# Patient Record
Sex: MALE | Race: WHITE | NOT HISPANIC OR LATINO | Employment: OTHER | URBAN - METROPOLITAN AREA
[De-identification: names, ages, dates, MRNs, and addresses within clinical notes are randomized per-mention and may not be internally consistent; named-entity substitution may affect disease eponyms.]

---

## 2019-06-28 ENCOUNTER — OFFICE VISIT (OUTPATIENT)
Dept: FAMILY MEDICINE | Facility: CLINIC | Age: 69
End: 2019-06-28
Payer: COMMERCIAL

## 2019-06-28 VITALS
OXYGEN SATURATION: 99 % | DIASTOLIC BLOOD PRESSURE: 82 MMHG | HEART RATE: 60 BPM | BODY MASS INDEX: 28.77 KG/M2 | SYSTOLIC BLOOD PRESSURE: 124 MMHG | WEIGHT: 192 LBS | TEMPERATURE: 97.1 F

## 2019-06-28 DIAGNOSIS — K20.80 ESOPHAGITIS DUE TO DRUG: ICD-10-CM

## 2019-06-28 DIAGNOSIS — I10 HTN, GOAL BELOW 140/90: ICD-10-CM

## 2019-06-28 DIAGNOSIS — Z76.89 ENCOUNTER TO ESTABLISH CARE: Primary | ICD-10-CM

## 2019-06-28 DIAGNOSIS — T36.4X5A ESOPHAGITIS DUE TO DRUG: ICD-10-CM

## 2019-06-28 PROCEDURE — 99203 OFFICE O/P NEW LOW 30 MIN: CPT | Performed by: FAMILY MEDICINE

## 2019-06-28 RX ORDER — CHLORTHALIDONE 25 MG/1
25 TABLET ORAL DAILY
Qty: 90 TABLET | Refills: 1 | Status: SHIPPED | OUTPATIENT
Start: 2019-06-28 | End: 2019-07-05

## 2019-06-28 RX ORDER — ATENOLOL 50 MG/1
50 TABLET ORAL DAILY
Qty: 90 TABLET | Refills: 1 | Status: SHIPPED | OUTPATIENT
Start: 2019-06-28 | End: 2019-07-05

## 2019-06-28 RX ORDER — ATENOLOL AND CHLORTHALIDONE TABLET 50; 25 MG/1; MG/1
1 TABLET ORAL DAILY
COMMUNITY
End: 2019-07-05

## 2019-06-28 RX ORDER — ATENOLOL AND CHLORTHALIDONE TABLET 50; 25 MG/1; MG/1
1 TABLET ORAL DAILY
Qty: 90 TABLET | Status: CANCELLED | OUTPATIENT
Start: 2019-06-28

## 2019-06-28 NOTE — PROGRESS NOTES
Subjective     Cezar Ward is a 68 year old male who presents to clinic today for the following health issues:    HPI   New Patient/Transfer of Care  Patient was with [facility] insurance changed and following today to establish care.  Discharging up physical last year in September.  He does take atenolol and chlorthalidone for blood pressure.  Been doing a combination of medication but wants to have a separate as is cheaper.    HTN:   Atenolol-chlorthalidone.  Blood pressure is well controlled and tolerating medications well.    Dysphagia:  Had tooth implant was given Clindamycin, last dose less than 1 week ago  Since then after eating feels like has stuff stuck in throat  Feels fine    Wellness:   Shingles: had 2 doses already  Colonoscopy: Last was 2/2019 Every 2 yrs due to polyps    There is no problem list on file for this patient.    Past Surgical History:   Procedure Laterality Date     TONSILLECTOMY  1959       Social History     Tobacco Use     Smoking status: Never Smoker     Smokeless tobacco: Never Used   Substance Use Topics     Alcohol use: Yes     Family History   Problem Relation Age of Onset     Hypertension Mother      Hypertension Father      Unknown/Adopted Maternal Grandmother      Unknown/Adopted Maternal Grandfather      Unknown/Adopted Paternal Grandmother      Unknown/Adopted Paternal Grandfather          Family History   Problem Relation Age of Onset     Hypertension Mother      Hypertension Father      Unknown/Adopted Maternal Grandmother      Unknown/Adopted Maternal Grandfather      Unknown/Adopted Paternal Grandmother      Unknown/Adopted Paternal Grandfather      PROBLEMS TO ADD ON...  Reviewed and updated as needed this visit by Provider    Review of Systems   Constitutional, HEENT, cardiovascular, pulmonary and gu systems are negative, except as otherwise noted.      Objective    /82   Pulse 60   Temp 97.1  F (36.2  C) (Oral)   Wt 87.1 kg (192 lb)   SpO2 99%    BMI 28.77 kg/m    Body mass index is 28.77 kg/m .  Physical Exam   GENERAL: healthy, alert and no distress  HENT: ear canals and TM's normal, nose and mouth without ulcers or lesions  NECK: no adenopathy and thyroid normal to palpation  RESP: lungs clear to auscultation - no rales, rhonchi or wheezes  CV: regular rate and rhythm, normal S1 S2, no S3 or S4, no murmur, click or rub, no peripheral edema  \ABDOMEN: soft, nontender, no masses and bowel sounds normal  MS: no gross musculoskeletal defects noted, no edema.    Assessment & Plan     Cezar was seen today for establish care and medication request.    Diagnoses and all orders for this visit:    Encounter to establish care     Reviewed medical problems and medications    HTN, goal below 140/90  -     atenolol (TENORMIN) 50 MG tablet; Take 1 tablet (50 mg) by mouth daily  -     chlorthalidone (HYGROTON) 25 MG tablet; Take 1 tablet (25 mg) by mouth daily    Esophagitis due to drug      -   Likely from medication, discussed watching the symptom for 2 weeks and if persistent will explore with a scope.    Otherwise follow-up with annual physical in 3 months    Return in about 2 weeks (around 7/12/2019) for Routine Visit.    Cory Esquivel MD  [facility]

## 2019-09-20 ENCOUNTER — OFFICE VISIT (OUTPATIENT)
Dept: ORTHOPEDICS | Facility: CLINIC | Age: 69
End: 2019-09-20
Payer: COMMERCIAL

## 2019-09-20 ENCOUNTER — ANCILLARY PROCEDURE (OUTPATIENT)
Dept: GENERAL RADIOLOGY | Facility: CLINIC | Age: 69
End: 2019-09-20
Attending: PEDIATRICS
Payer: COMMERCIAL

## 2019-09-20 VITALS
HEIGHT: 70 IN | SYSTOLIC BLOOD PRESSURE: 124 MMHG | DIASTOLIC BLOOD PRESSURE: 76 MMHG | WEIGHT: 185 LBS | BODY MASS INDEX: 26.48 KG/M2

## 2019-09-20 DIAGNOSIS — M25.561 CHRONIC PAIN OF BOTH KNEES: Primary | ICD-10-CM

## 2019-09-20 DIAGNOSIS — M25.562 BILATERAL KNEE PAIN: ICD-10-CM

## 2019-09-20 DIAGNOSIS — M25.562 CHRONIC PAIN OF BOTH KNEES: Primary | ICD-10-CM

## 2019-09-20 DIAGNOSIS — M25.561 BILATERAL KNEE PAIN: ICD-10-CM

## 2019-09-20 DIAGNOSIS — G89.29 CHRONIC PAIN OF BOTH KNEES: Primary | ICD-10-CM

## 2019-09-20 DIAGNOSIS — M17.0 PRIMARY OSTEOARTHRITIS OF BOTH KNEES: ICD-10-CM

## 2019-09-20 PROCEDURE — 73562 X-RAY EXAM OF KNEE 3: CPT | Mod: LT | Performed by: PEDIATRICS

## 2019-09-20 PROCEDURE — 99204 OFFICE O/P NEW MOD 45 MIN: CPT | Performed by: PEDIATRICS

## 2019-09-20 ASSESSMENT — MIFFLIN-ST. JEOR: SCORE: 1610.4

## 2019-09-20 NOTE — PATIENT INSTRUCTIONS
Pain in the anterior knee with biking is consistent with patellofemoral source (under the kneecap); there is some degenerative change noted in this area on x-rays  Ice, Over the counter medications as needed   Activity modification discussed  May wear compression or wrap as desired  Referred to physical therapy  Monitor course next ~1 month with PT

## 2019-09-20 NOTE — PROGRESS NOTES
Sports Medicine Clinic Visit    PCP: Juan Carlisle    Cezar Ward is a 69 year old male who is seen  as a self referral presenting with bilateral knee pain.  Pain began in his right knee about 1 year ago.  He stopped biking due to the pain with the downward motion.  He now has some pain in his left knee as well.  Pain over the proximal aspect of his knee.      **  Pain located inferior and superior of the bilateral patella. Occasional radiating pain present. Symptoms worsen with downward pressure on the right knee; pain is present when biking uphill/upgrade. He is a moderate biker.  Patient is most comfortable when knees are fully extended while biking. Patient is able to walk on treadmill with incline and stairs with not much pain.   Notes left knee has been swollen over past couple of weeks.    **  Patient noted that he has more perspiration with the same amount of exercise.    Injury: gradual onset     Location of Pain: bilateral anterior knee   Duration of Pain: 1 year(s)  Rating of Pain at worst: 6/10  Rating of Pain Currently: 1/10  Symptoms are better with: Rest  Symptoms are worse with: riding bike, prolonged walking   Additional Features:   Positive: swelling, popping    Negative: bruising, grinding, catching, locking, instability, paresthesias, numbness, weakness, pain in other joints and systemic symptoms  Other evaluation and/or treatments so far consists of: acetaminophen   Prior History of related problems: denies     Social History: artist,      Review of Systems  Musculoskeletal: as above  Remainder of review of systems is negative including constitutional, CV, pulmonary, GI, Skin and Neurologic except as noted in HPI or medical history.    Past Medical History:   Diagnosis Date     Chronic neck pain     Cervical radiculopathy     Eczema      ED (erectile dysfunction)      HTN     Borderline     Narcolepsy      Other and unspecified hyperlipidemia      Past Surgical History:    Procedure Laterality Date     TONSILLECTOMY  1959     Family History   Problem Relation Age of Onset     Hypertension Mother      Hypertension Father      Unknown/Adopted Maternal Grandmother      Unknown/Adopted Maternal Grandfather      Unknown/Adopted Paternal Grandmother      Unknown/Adopted Paternal Grandfather      Social History     Socioeconomic History     Marital status:      Spouse name: Not on file     Number of children: Not on file     Years of education: Not on file     Highest education level: Not on file   Occupational History     Not on file   Social Needs     Financial resource strain: Not on file     Food insecurity:     Worry: Not on file     Inability: Not on file     Transportation needs:     Medical: Not on file     Non-medical: Not on file   Tobacco Use     Smoking status: Never Smoker     Smokeless tobacco: Never Used   Substance and Sexual Activity     Alcohol use: Yes     Drug use: No     Sexual activity: Yes     Partners: Female   Lifestyle     Physical activity:     Days per week: Not on file     Minutes per session: Not on file     Stress: Not on file   Relationships     Social connections:     Talks on phone: Not on file     Gets together: Not on file     Attends [employer] service: Not on file     Active member of club or organization: Not on file     Attends meetings of clubs or organizations: Not on file     Relationship status: Not on file     Intimate partner violence:     Fear of current or ex partner: Not on file     Emotionally abused: Not on file     Physically abused: Not on file     Forced sexual activity: Not on file   Other Topics Concern     Parent/sibling w/ CABG, MI or angioplasty before 65F 55M? Not Asked   Social History Narrative     Not on file   This document serves as a record of the services and decisions personally performed and made by DO BURKE Lorenzo. It was created on his behalf by Gregg Mann, a trained medical scribe. The creation of this  "document is based the provider's statements to the medical scribe.    Bernardo Mann 9:03 AM 9/20/2019      Objective  /76   Ht 1.778 m (5' 10\")   Wt 83.9 kg (185 lb)   BMI 26.54 kg/m      GENERAL APPEARANCE: healthy, alert and no distress   GAIT: NORMAL  SKIN: no suspicious lesions or rashes  NEURO: Normal strength and tone, mentation intact and speech normal  PSYCH:  mentation appears normal and affect normal/bright  HEENT: no scleral icterus  CV: distal perfusion intact  RESP: nonlabored breathing    Bilateral Knee exam    ROM:        Full active and passive ROM with flexion and extension BILATERAL  Pressure noted with extension per pt    Inspection:       No visible ecchymosis       Trace fluid noted on the left knee currently    Skin:       no visible deformities       well perfused       capillary refill brisk    Patellar Motion:        Crepitus noted in the patellofemoral joint BILATERAL       No pain with patellar translation BILATERAL    Tender:        No focal tenderness    Special Tests:        neg (-) Lance       neg (-) Lachman       neg (-) anterior drawer       neg (-) posterior drawer       neg (-) varus at 0 to 30 degrees        neg (-) valgus at 0 to 30 degrees        No pain with forced extension    Evaluation of ipsilateral kinetic chain:       No pain with mini squat, single left leg squat        Mild anterior patella pain with single right leg squat           Radiology  Visualized radiographs of bilateral standing knee obtained today, and reviewed the images with the patient.  Impression: Bilateral PA, bilateral lateral, bilateral sunrise views of the knees demonstrate mild left lateral compartment narrowing relative to the right.  There also appears to be bilateral patellofemoral compartment narrowing.  Calcific densities at the posterior aspect of each knee are likely related to degenerative change, and may represent articular bodies.  Calcification at the medial aspect of " the left knee may be related to degenerative change or history of injury.  No clear acute osseous abnormality identified.    Assessment:  1. Chronic pain of both knees    2. Primary osteoarthritis of both knees        Plan:  Discussed the assessment with the patient. Degenerative change primarily patellofemoral.    Radiologic images reviewed and discussed with patient today  We discussed the following: symptom treatment, activity modification/rest, imaging, rehab, injection therapy, medication, potential for improvement with time, support for the affected area and orthopedic surgery. Following discussion, plan:  Ice, Over the counter medications as needed   Activity modification discussed   Rehab: Physical therapy referral placed   Support: Discussed compression and wrap; may wear as desired  Medications: Discussed short term NSAIDs like Ibuprofen and Naproxen, vs long term Celebrex; Patient will begin physical therapy first  Injection: Discussed cortisone injection vs visco injection vs biologics (Stem cell, PRP). Patient will begin physical therapy first   Patient inquired about a stem cell injection.   Discussed orthopedic surgery referral as the ultimate option  Follow up: 3-4 weeks if not improving with PT, sooner if needed.   Future Considerations: Medications, Injections.   Questions answered. The patient indicates understanding of these issues and agrees with the plan.    **  Discussed checking with primary care doctor if SOB and chest pain present      Kwadwo Ramirez, DO, CAQ          Patient Instructions   Pain in the anterior knee with biking is consistent with patellofemoral source (under the kneecap); there is some degenerative change noted in this area on x-rays  Ice, Over the counter medications as needed   Activity modification discussed  May wear compression or wrap as desired  Referred to physical therapy  Monitor course next ~1 month with PT          Disclaimer: This note consists of symbols  derived from keyboarding, dictation and/or voice recognition software. As a result, there may be errors in the script that have gone undetected. Please consider this when interpreting information found in this chart.    The information in this document, created by a scribe for me, accurately reflects the services I personally performed and the decisions made by me. I have reviewed and approved this document for accuracy.

## 2019-10-01 ENCOUNTER — THERAPY VISIT (OUTPATIENT)
Dept: PHYSICAL THERAPY | Facility: CLINIC | Age: 69
End: 2019-10-01
Payer: COMMERCIAL

## 2019-10-01 DIAGNOSIS — M25.562 PAIN IN BOTH KNEES: ICD-10-CM

## 2019-10-01 DIAGNOSIS — M25.561 PAIN IN BOTH KNEES: ICD-10-CM

## 2019-10-01 DIAGNOSIS — M25.562 CHRONIC PAIN OF BOTH KNEES: ICD-10-CM

## 2019-10-01 DIAGNOSIS — M25.561 CHRONIC PAIN OF BOTH KNEES: ICD-10-CM

## 2019-10-01 DIAGNOSIS — G89.29 CHRONIC PAIN OF BOTH KNEES: ICD-10-CM

## 2019-10-01 PROCEDURE — 97112 NEUROMUSCULAR REEDUCATION: CPT | Mod: GP | Performed by: PHYSICAL THERAPIST

## 2019-10-01 PROCEDURE — 97110 THERAPEUTIC EXERCISES: CPT | Mod: GP | Performed by: PHYSICAL THERAPIST

## 2019-10-01 PROCEDURE — 97161 PT EVAL LOW COMPLEX 20 MIN: CPT | Mod: GP | Performed by: PHYSICAL THERAPIST

## 2019-10-01 ASSESSMENT — ACTIVITIES OF DAILY LIVING (ADL)
HOW_WOULD_YOU_RATE_THE_OVERALL_FUNCTION_OF_YOUR_KNEE_DURING_YOUR_USUAL_DAILY_ACTIVITIES?: NEARLY NORMAL
WALK: ACTIVITY IS NOT DIFFICULT
KNEE_ACTIVITY_OF_DAILY_LIVING_SUM: 58
SIT WITH YOUR KNEE BENT: ACTIVITY IS NOT DIFFICULT
KNEEL ON THE FRONT OF YOUR KNEE: ACTIVITY IS MINIMALLY DIFFICULT
SQUAT: ACTIVITY IS MINIMALLY DIFFICULT
RAW_SCORE: 58
GO DOWN STAIRS: ACTIVITY IS NOT DIFFICULT
STIFFNESS: I DO NOT HAVE THE SYMPTOM
WEAKNESS: THE SYMPTOM AFFECTS MY ACTIVITY SEVERELY
HOW_WOULD_YOU_RATE_THE_CURRENT_FUNCTION_OF_YOUR_KNEE_DURING_YOUR_USUAL_DAILY_ACTIVITIES_ON_A_SCALE_FROM_0_TO_100_WITH_100_BEING_YOUR_LEVEL_OF_KNEE_FUNCTION_PRIOR_TO_YOUR_INJURY_AND_0_BEING_THE_INABILITY_TO_PERFORM_ANY_OF_YOUR_USUAL_DAILY_ACTIVITIES?: 80
STAND: ACTIVITY IS NOT DIFFICULT
PAIN: THE SYMPTOM AFFECTS MY ACTIVITY SEVERELY
RISE FROM A CHAIR: ACTIVITY IS MINIMALLY DIFFICULT
KNEE_ACTIVITY_OF_DAILY_LIVING_SCORE: 82.86
GIVING WAY, BUCKLING OR SHIFTING OF KNEE: I DO NOT HAVE THE SYMPTOM
AS_A_RESULT_OF_YOUR_KNEE_INJURY,_HOW_WOULD_YOU_RATE_YOUR_CURRENT_LEVEL_OF_DAILY_ACTIVITY?: ABNORMAL
LIMPING: I DO NOT HAVE THE SYMPTOM
SWELLING: I DO NOT HAVE THE SYMPTOM
GO UP STAIRS: ACTIVITY IS MINIMALLY DIFFICULT

## 2019-10-01 NOTE — PROGRESS NOTES
[facility] Initial Evaluation  Subjective:    Type of problem:  Bilateral knees (R>L)   Condition occurred with:  Insidious onset.    Problem details: Pain began about 18 months ago. First noticed it when he was biking on inclines.   Patient reports pain:  Anterior.  Associated symptoms:  Loss of motion/stiffness and loss of strength. Symptoms are exacerbated by ascending stairs, bending/squatting, sitting and transfers (biking) and relieved by rest and NSAID's.    Where condition occurred: for unknown reasons.  and reported as 7/10 on pain scale. General health as reported by patient is good. Pertinent medical history includes:  High blood pressure.    Surgeries include:  None.  Current medications:  High blood pressure medication.     Pain is described as aching and is intermittent. Pain timing: no pattern. Since onset symptoms are gradually worsening. Special tests:  X-ray.     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    Barriers include:  None as reported by patient.  Red flags:  None as reported by patient.                      Objective:  Standing Alignment:              Knee:  Normal          Flexibility/Screens:       Lower Extremity:  Decreased left lower extremity flexibility:Hamstrings    Decreased right lower extremity flexibility:  Hamstrings                                                 Hip Evaluation  HIP AROM:  AROM:    Left Hip:     Normal    Right Hip:   Normal                    Hip Strength:    Flexion:   Left: 5/5   Pain:  Right: 5/5   Pain:                    Extension:  Left: 4-/5  Pain:Right: 4/5    Pain:    Abduction:  Left: 4-/5     Pain:Right: 4/5    Pain:  Adduction:  Left: 5/5    Pain:Right: 5/5   Pain:  Internal Rotation:  Left: 5/5    Pain:Right: 5/5   Pain:              Hip Special Testing:      Left hip negative for the following special tests:  Mary Anne  Right hip negative for the following special tests:   Mary Anne           Knee Evaluation:  ROM:  AROM: normal            Strength:         Quad Set Left: Delayed    Pain:   Quad Set Right: Good    Pain:    Special Tests:   Left knee positive for the following special tests:  Patellar Compression; Patellar Tracking-Abduction Lateral and Twan's  Right knee positive for the following tests:  Patellar Compression; Patellar Tracking-Abduction Lateral and Twan's  Palpation:  Normal      Edema:  Normal    Mobility Testing:      Patellofemoral Medial:  Left: hypomobile    Right: hypomobile  Patellofemoral Lateral:  Left: hypomobile    Right: hypomobile      Functional Testing:          Quad:    Single Leg Squat:  Left:      Right:        Bilateral Leg Squat:   Mild loss of control and excessive anterior knee excursion              General     ROS    Assessment/Plan:    Patient is a 69 year old male with both sides knee complaints.    Patient has the following significant findings with corresponding treatment plan.                Diagnosis 1:  B knee pain    Pain -  hot/cold therapy, manual therapy, splint/taping/bracing/orthotics, self management, education and home program  Decreased ROM/flexibility - manual therapy, therapeutic exercise and home program  Decreased strength - therapeutic exercise, therapeutic activities and home program    Therapy Evaluation Codes:   1) History comprised of:   Personal factors that impact the plan of care:      None.    Comorbidity factors that impact the plan of care are:      None.     Medications impacting care: None.  2) Examination of Body Systems comprised of:   Body structures and functions that impact the plan of care:      Hip, Knee and Lumbar spine.   Activity limitations that impact the plan of care are:      Driving, Sports, Squatting/kneeling and Stairs.  3) Clinical presentation characteristics are:   Stable/Uncomplicated.  4) Decision-Making    Low complexity using standardized patient assessment instrument and/or measureable  assessment of functional outcome.  Cumulative Therapy Evaluation is: Low complexity.    Previous and current functional limitations:  (See Goal Flow Sheet for this information)    Short term and Long term goals: (See Goal Flow Sheet for this information)     Communication ability:  Patient appears to be able to clearly communicate and understand verbal and written communication and follow directions correctly.  Treatment Explanation - The following has been discussed with the patient:   RX ordered/plan of care  Anticipated outcomes  Possible risks and side effects  This patient would benefit from PT intervention to resume normal activities.   Rehab potential is good.    Frequency:  1 X week, once daily  Duration:  for 8 weeks  Discharge Plan:  Achieve all LTG.  Independent in home treatment program.  Reach maximal therapeutic benefit.    Please refer to the daily flowsheet for treatment today, total treatment time and time spent performing 1:1 timed codes.

## 2019-10-31 ENCOUNTER — OFFICE VISIT (OUTPATIENT)
Dept: ORTHOPEDICS | Facility: CLINIC | Age: 69
End: 2019-10-31
Payer: COMMERCIAL

## 2019-10-31 VITALS — DIASTOLIC BLOOD PRESSURE: 76 MMHG | HEIGHT: 70 IN | BODY MASS INDEX: 26.54 KG/M2 | SYSTOLIC BLOOD PRESSURE: 124 MMHG

## 2019-10-31 DIAGNOSIS — M17.12 ARTHRITIS OF LEFT KNEE: Primary | ICD-10-CM

## 2019-10-31 DIAGNOSIS — M25.561 CHRONIC PAIN OF BOTH KNEES: ICD-10-CM

## 2019-10-31 DIAGNOSIS — M25.562 CHRONIC PAIN OF BOTH KNEES: ICD-10-CM

## 2019-10-31 DIAGNOSIS — G89.29 CHRONIC PAIN OF BOTH KNEES: ICD-10-CM

## 2019-10-31 PROCEDURE — 99213 OFFICE O/P EST LOW 20 MIN: CPT | Performed by: FAMILY MEDICINE

## 2019-10-31 NOTE — PATIENT INSTRUCTIONS
Diagnosis: Left knee pain related to arthritis   Image Findings: Mild arthritis notable under the knee cap  Treatment: Relative rest, physical therapy, home exercises, can consider steroid injection  Medications: Voltaren topically  Follow-up: As needed for a steroid injection.    It was great seeing you today!    Cruz Solis

## 2019-10-31 NOTE — LETTER
10/31/2019         RE: Cezar Ward  [address] [place] [id number]        Dear Colleague,    Thank you for referring your patient, Cezar Ward, to the [facility]. Please see a copy of my visit note below.    ASSESSMENT & PLAN  Cezar was seen today for pain.    Diagnoses and all orders for this visit:    Arthritis of left knee  -     diclofenac (VOLTAREN) 1 % topical gel; Place 2 g onto the skin 4 times daily    Chronic pain of both knees      Patient is a 69 year old male presenting for evaluation of   Chief Complaint   Patient presents with     Left Knee - Pain      # Left Knee Arthritis: Notable over the past couple of weeks worse with going down stairs.  Pain behind the knee cap.  Pain with grind test otherwise neg exam today.  Previous XR showing mild OA worse in the PF compartment.  Counseled patient on nature of condition and treatment options.  Pain not severe now, no risk factors for DVT, no sig calf swelling or pain.  Low concern for DVT but will reassess should sx worsen.  Given this plan as below, follow-up as needed  Treatment: Activities as tolerated  Physical Therapy [facility], if not improved can refer for formal PT  Injection Defer for now can f/u for steroid injection should pain worsen  Medications Voltaren gel, otherwise  Limited NSAIDs/Tylenol    Concerning signs/sx that would warrant urgent evaluation were discussed.  All questions were answered, patient understands and agrees with plan.      Return if symptoms worsen or fail to improve.    -----    SUBJECTIVE  Cezar Ward is a/an 69 year old male who is seen as a self referral, [facility] for evaluation of left knee pain. The patient is seen by themselves.    Onset: 2 week(s) ago. Reports insidious onset without acute precipitating event.  Has seen Dr. Ramirez  On 9/20/19 was given recs including PT went to 2 visits with some flare of left knee.  No new injury.  Pt notes bilateral pain  "initially right now left reports some dec ROM  Location of Pain: left anterior knee   Rating of Pain at worst: \"moderate\" \"annoying\"  Rating of Pain Currently: mild  Worsened by: biking, going up and down stairs, knee   Better with: ROM   Treatments tried: physical therapy (1 visit), ice    Associated symptoms: swelling, hypermobility of patella, limited ROM   Orthopedic history: YES - Saw Dr. Ramirez 9/20/19  Relevant surgical history: NO  Past Medical History:   Diagnosis Date     Chronic neck pain     Cervical radiculopathy     Eczema      ED (erectile dysfunction)      HTN     Borderline     Narcolepsy      Other and unspecified hyperlipidemia      Social History     Socioeconomic History     Marital status:      Spouse name: None     Number of children: None     Years of education: None     Highest education level: None   Occupational History     None   Social Needs     Financial resource strain: None     Food insecurity:     Worry: None     Inability: None     Transportation needs:     Medical: None     Non-medical: None   Tobacco Use     Smoking status: Never Smoker     Smokeless tobacco: Never Used   Substance and Sexual Activity     Alcohol use: Yes     Drug use: No     Sexual activity: Yes     Partners: Female   Lifestyle     Physical activity:     Days per week: None     Minutes per session: None     Stress: None   Relationships     Social connections:     Talks on phone: None     Gets together: None     Attends [employer] service: None     Active member of club or organization: None     Attends meetings of clubs or organizations: None     Relationship status: None     Intimate partner violence:     Fear of current or ex partner: None     Emotionally abused: None     Physically abused: None     Forced sexual activity: None   Other Topics Concern     Parent/sibling w/ CABG, MI or angioplasty before 65F 55M? Not Asked   Social History Narrative     None         Patient's past medical, surgical, " "social, and family histories were reviewed today and no changes are noted.  No family history pertinent to the patient's problem today    REVIEW OF SYSTEMS:  10 point ROS is negative other than symptoms noted above in HPI, Past Medical History or as stated below  Constitutional: NEGATIVE for fever, chills, change in weight  Skin: NEGATIVE for worrisome rashes, moles or lesions  GI/: NEGATIVE for bowel or bladder changes  Neuro: NEGATIVE for weakness, dizziness or paresthesias    OBJECTIVE:  /76   Ht 1.778 m (5' 10\")   BMI 26.54 kg/m      General: healthy, alert and in no distress  HEENT: no scleral icterus or conjunctival erythema  Skin: no suspicious lesions or rash. No jaundice.  CV: no pedal edema  Resp: normal respiratory effort without conversational dyspnea   Psych: normal mood and affect  Gait: normal steady gait with appropriate coordination and balance  Neuro: Normal light sensory exam of lower extremity  MSK:  LEFT KNEE  Inspection:    normal alignment  Palpation:  Nontender.    Small effusion is present    Patellofemoral crepitus is Present  Range of Motion:     00 extension to 1200 flexion  Strength:    Quadriceps 5/5, hamstrings 5/5, gastrocsoleus 5/5 and tibialis anterior 5/5    Extensor mechanism intact  Special Tests:    Positive: Patellar grind    Negative: patellar apprehension, MCL/valgus stress (0 & 30 deg), LCL/varus stress (0 & 30 deg), Lachman's, anterior drawer, posterior drawer, Lance's    Independent visualization of the below image:  No results found for this or any previous visit (from the past 24 hour(s)).  Bilateral PA, bilateral lateral, bilateral sunrise views of the knees   demonstrate mild left lateral compartment narrowing relative to the right.    There also appears to be bilateral patellofemoral compartment narrowing.    Calcific densities at the posterior aspect of each knee are likely related   to degenerative change, and may represent articular bodies. "  Calcification   at the medial aspect of the left knee may be related to degenerative   change or history of injury.  No clear acute osseous abnormality   identified.    Kwadwo Ramirez DO, CAQ     I  visualized and reviewed these images with the patient         Cruz Solis MD, [facility]      Again, thank you for allowing me to participate in the care of your patient.        Sincerely,        Cruz Solis MD

## 2019-10-31 NOTE — PROGRESS NOTES
"ASSESSMENT & PLAN  Cezar was seen today for pain.    Diagnoses and all orders for this visit:    Arthritis of left knee  -     diclofenac (VOLTAREN) 1 % topical gel; Place 2 g onto the skin 4 times daily    Chronic pain of both knees      Patient is a 69 year old male presenting for evaluation of   Chief Complaint   Patient presents with     Left Knee - Pain      # Left Knee Arthritis: Notable over the past couple of weeks worse with going down stairs.  Pain behind the knee cap.  Pain with grind test otherwise neg exam today.  Previous XR showing mild OA worse in the PF compartment.  Counseled patient on nature of condition and treatment options.  Pain not severe now, no risk factors for DVT, no sig calf swelling or pain.  Low concern for DVT but will reassess should sx worsen.  Given this plan as below, follow-up as needed  Treatment: Activities as tolerated  Physical Therapy HEP, if not improved can refer for formal PT  Injection Defer for now can f/u for steroid injection should pain worsen  Medications Voltaren gel, otherwise  Limited NSAIDs/Tylenol    Concerning signs/sx that would warrant urgent evaluation were discussed.  All questions were answered, patient understands and agrees with plan.      Return if symptoms worsen or fail to improve.    -----    SUBJECTIVE  Cezar Ward is a/an 69 year old male who is seen as a self referral, [facility] for evaluation of left knee pain. The patient is seen by themselves.    Onset: 2 week(s) ago. Reports insidious onset without acute precipitating event.  Has seen Dr. Ramirez  On 9/20/19 was given recs including PT went to 2 visits with some flare of left knee.  No new injury.  Pt notes bilateral pain initially right now left reports some dec ROM  Location of Pain: left anterior knee   Rating of Pain at worst: \"moderate\" \"annoying\"  Rating of Pain Currently: mild  Worsened by: biking, going up and down stairs, knee   Better with: ROM   Treatments tried: physical " therapy (1 visit), ice    Associated symptoms: swelling, hypermobility of patella, limited ROM   Orthopedic history: YES - Saw Dr. Ramirez 9/20/19  Relevant surgical history: NO  Past Medical History:   Diagnosis Date     Chronic neck pain     Cervical radiculopathy     Eczema      ED (erectile dysfunction)      HTN     Borderline     Narcolepsy      Other and unspecified hyperlipidemia      Social History     Socioeconomic History     Marital status:      Spouse name: None     Number of children: None     Years of education: None     Highest education level: None   Occupational History     None   Social Needs     Financial resource strain: None     Food insecurity:     Worry: None     Inability: None     Transportation needs:     Medical: None     Non-medical: None   Tobacco Use     Smoking status: Never Smoker     Smokeless tobacco: Never Used   Substance and Sexual Activity     Alcohol use: Yes     Drug use: No     Sexual activity: Yes     Partners: Female   Lifestyle     Physical activity:     Days per week: None     Minutes per session: None     Stress: None   Relationships     Social connections:     Talks on phone: None     Gets together: None     Attends Rastafarian service: None     Active member of club or organization: None     Attends meetings of clubs or organizations: None     Relationship status: None     Intimate partner violence:     Fear of current or ex partner: None     Emotionally abused: None     Physically abused: None     Forced sexual activity: None   Other Topics Concern     Parent/sibling w/ CABG, MI or angioplasty before 65F 55M? Not Asked   Social History Narrative     None         Patient's past medical, surgical, social, and family histories were reviewed today and no changes are noted.  No family history pertinent to the patient's problem today    REVIEW OF SYSTEMS:  10 point ROS is negative other than symptoms noted above in HPI, Past Medical History or as stated  "below  Constitutional: NEGATIVE for fever, chills, change in weight  Skin: NEGATIVE for worrisome rashes, moles or lesions  GI/: NEGATIVE for bowel or bladder changes  Neuro: NEGATIVE for weakness, dizziness or paresthesias    OBJECTIVE:  /76   Ht 1.778 m (5' 10\")   BMI 26.54 kg/m     General: healthy, alert and in no distress  HEENT: no scleral icterus or conjunctival erythema  Skin: no suspicious lesions or rash. No jaundice.  CV: no pedal edema  Resp: normal respiratory effort without conversational dyspnea   Psych: normal mood and affect  Gait: normal steady gait with appropriate coordination and balance  Neuro: Normal light sensory exam of lower extremity  MSK:  LEFT KNEE  Inspection:    normal alignment  Palpation:  Nontender.    Small effusion is present    Patellofemoral crepitus is Present  Range of Motion:     00 extension to 1200 flexion  Strength:    Quadriceps 5/5, hamstrings 5/5, gastrocsoleus 5/5 and tibialis anterior 5/5    Extensor mechanism intact  Special Tests:    Positive: Patellar grind    Negative: patellar apprehension, MCL/valgus stress (0 & 30 deg), LCL/varus stress (0 & 30 deg), Lachman's, anterior drawer, posterior drawer, Lance's    Independent visualization of the below image:  No results found for this or any previous visit (from the past 24 hour(s)).  Bilateral PA, bilateral lateral, bilateral sunrise views of the knees   demonstrate mild left lateral compartment narrowing relative to the right.    There also appears to be bilateral patellofemoral compartment narrowing.    Calcific densities at the posterior aspect of each knee are likely related   to degenerative change, and may represent articular bodies.  Calcification   at the medial aspect of the left knee may be related to degenerative   change or history of injury.  No clear acute osseous abnormality   identified.    Kwadwo Ramirez DO, CAQ     I  visualized and reviewed these images with the patient     "     Cruz Solis MD, [facility]

## 2019-11-19 PROBLEM — M25.562 PAIN IN BOTH KNEES: Status: RESOLVED | Noted: 2019-10-01 | Resolved: 2019-11-19

## 2019-11-19 PROBLEM — M25.561 PAIN IN BOTH KNEES: Status: RESOLVED | Noted: 2019-10-01 | Resolved: 2019-11-19

## 2020-01-10 DIAGNOSIS — I10 HTN, GOAL BELOW 140/90: ICD-10-CM

## 2020-01-10 NOTE — LETTER
January 13, 2020          Cezar Ward,  [address] [address]          Dear Cezar Ward      Your provider has sent a 30 day thom refill of atenolol-chlorthalidone (TENORETIC) 50-25 MG tablet. You are due for an appointment for further refills. Appointment options could include: an in person office visit, telephone visit or Evisit through [facility] [employer]. Please contact the clinic to schedule an appointment for further refills.       If you have received your health care elsewhere, please call the clinic so the information can be documented in your chart.    Please call [phone] or message us through your [employer] account to schedule an appointment or provide information for your chart.     Feel free to contact us if you have any questions or concerns!    I look forward to seeing you and working with you on your health care needs.     Sincerely,       Your [facility]

## 2020-01-13 RX ORDER — ATENOLOL AND CHLORTHALIDONE TABLET 50; 25 MG/1; MG/1
TABLET ORAL
Qty: 30 TABLET | Refills: 0 | Status: SHIPPED | OUTPATIENT
Start: 2020-01-13 | End: 2020-02-11

## 2020-01-13 NOTE — TELEPHONE ENCOUNTER
Rx sent for 30 days. Please write letter to schedule for further refills.     Rose Marie Farrell RN

## 2020-02-07 ENCOUNTER — TELEPHONE (OUTPATIENT)
Dept: FAMILY MEDICINE | Facility: CLINIC | Age: 70
End: 2020-02-07

## 2020-02-07 NOTE — TELEPHONE ENCOUNTER
Patient is due for a physical. This cannot be completed over the phone. Please call and schedule OV.     Rose Marie Farrell RN

## 2020-02-11 ENCOUNTER — OFFICE VISIT (OUTPATIENT)
Dept: FAMILY MEDICINE | Facility: CLINIC | Age: 70
End: 2020-02-11
Payer: COMMERCIAL

## 2020-02-11 VITALS
DIASTOLIC BLOOD PRESSURE: 80 MMHG | SYSTOLIC BLOOD PRESSURE: 132 MMHG | BODY MASS INDEX: 27.41 KG/M2 | TEMPERATURE: 97.3 F | WEIGHT: 191 LBS | HEART RATE: 75 BPM | OXYGEN SATURATION: 99 %

## 2020-02-11 DIAGNOSIS — E55.9 VITAMIN D DEFICIENCY, UNSPECIFIED: ICD-10-CM

## 2020-02-11 DIAGNOSIS — Z12.5 SCREENING FOR PROSTATE CANCER: ICD-10-CM

## 2020-02-11 DIAGNOSIS — R53.83 FATIGUE, UNSPECIFIED TYPE: ICD-10-CM

## 2020-02-11 DIAGNOSIS — Z00.00 ENCOUNTER FOR MEDICARE ANNUAL WELLNESS EXAM: Primary | ICD-10-CM

## 2020-02-11 DIAGNOSIS — L30.9 DERMATITIS: ICD-10-CM

## 2020-02-11 DIAGNOSIS — K64.9 HEMORRHOIDS, UNSPECIFIED HEMORRHOID TYPE: ICD-10-CM

## 2020-02-11 DIAGNOSIS — I10 HTN, GOAL BELOW 140/90: ICD-10-CM

## 2020-02-11 LAB
ALBUMIN SERPL-MCNC: 3.9 G/DL (ref 3.4–5)
ALP SERPL-CCNC: 75 U/L (ref 40–150)
ALT SERPL W P-5'-P-CCNC: 19 U/L (ref 0–70)
ANION GAP SERPL CALCULATED.3IONS-SCNC: 7 MMOL/L (ref 3–14)
AST SERPL W P-5'-P-CCNC: 19 U/L (ref 0–45)
BASOPHILS # BLD AUTO: 0 10E9/L (ref 0–0.2)
BASOPHILS NFR BLD AUTO: 0.2 %
BILIRUB SERPL-MCNC: 0.3 MG/DL (ref 0.2–1.3)
BUN SERPL-MCNC: 23 MG/DL (ref 7–30)
CALCIUM SERPL-MCNC: 9.5 MG/DL (ref 8.5–10.1)
CHLORIDE SERPL-SCNC: 104 MMOL/L (ref 94–109)
CHOLEST SERPL-MCNC: 206 MG/DL
CO2 SERPL-SCNC: 30 MMOL/L (ref 20–32)
CREAT SERPL-MCNC: 0.92 MG/DL (ref 0.66–1.25)
DIFFERENTIAL METHOD BLD: ABNORMAL
EOSINOPHIL # BLD AUTO: 0.2 10E9/L (ref 0–0.7)
EOSINOPHIL NFR BLD AUTO: 3.9 %
ERYTHROCYTE [DISTWIDTH] IN BLOOD BY AUTOMATED COUNT: 16.9 % (ref 10–15)
GFR SERPL CREATININE-BSD FRML MDRD: 84 ML/MIN/{1.73_M2}
GLUCOSE SERPL-MCNC: 83 MG/DL (ref 70–99)
HCT VFR BLD AUTO: 36.9 % (ref 40–53)
HDLC SERPL-MCNC: 46 MG/DL
HGB BLD-MCNC: 11.3 G/DL (ref 13.3–17.7)
LDLC SERPL CALC-MCNC: 106 MG/DL
LYMPHOCYTES # BLD AUTO: 1.2 10E9/L (ref 0.8–5.3)
LYMPHOCYTES NFR BLD AUTO: 23.6 %
MCH RBC QN AUTO: 24.4 PG (ref 26.5–33)
MCHC RBC AUTO-ENTMCNC: 30.6 G/DL (ref 31.5–36.5)
MCV RBC AUTO: 80 FL (ref 78–100)
MONOCYTES # BLD AUTO: 0.5 10E9/L (ref 0–1.3)
MONOCYTES NFR BLD AUTO: 9.7 %
NEUTROPHILS # BLD AUTO: 3.2 10E9/L (ref 1.6–8.3)
NEUTROPHILS NFR BLD AUTO: 62.6 %
NONHDLC SERPL-MCNC: 160 MG/DL
PLATELET # BLD AUTO: 313 10E9/L (ref 150–450)
POTASSIUM SERPL-SCNC: 3.7 MMOL/L (ref 3.4–5.3)
PROT SERPL-MCNC: 8 G/DL (ref 6.8–8.8)
PSA SERPL-ACNC: 3.98 UG/L (ref 0–4)
RBC # BLD AUTO: 4.64 10E12/L (ref 4.4–5.9)
SODIUM SERPL-SCNC: 141 MMOL/L (ref 133–144)
TRIGL SERPL-MCNC: 270 MG/DL
WBC # BLD AUTO: 5.2 10E9/L (ref 4–11)

## 2020-02-11 PROCEDURE — G0009 ADMIN PNEUMOCOCCAL VACCINE: HCPCS | Performed by: FAMILY MEDICINE

## 2020-02-11 PROCEDURE — 80061 LIPID PANEL: CPT | Performed by: FAMILY MEDICINE

## 2020-02-11 PROCEDURE — 99397 PER PM REEVAL EST PAT 65+ YR: CPT | Mod: 25 | Performed by: FAMILY MEDICINE

## 2020-02-11 PROCEDURE — 80053 COMPREHEN METABOLIC PANEL: CPT | Performed by: FAMILY MEDICINE

## 2020-02-11 PROCEDURE — 85025 COMPLETE CBC W/AUTO DIFF WBC: CPT | Performed by: FAMILY MEDICINE

## 2020-02-11 PROCEDURE — 36415 COLL VENOUS BLD VENIPUNCTURE: CPT | Performed by: FAMILY MEDICINE

## 2020-02-11 PROCEDURE — G0103 PSA SCREENING: HCPCS | Performed by: FAMILY MEDICINE

## 2020-02-11 PROCEDURE — 82306 VITAMIN D 25 HYDROXY: CPT | Performed by: FAMILY MEDICINE

## 2020-02-11 PROCEDURE — 99213 OFFICE O/P EST LOW 20 MIN: CPT | Mod: 25 | Performed by: FAMILY MEDICINE

## 2020-02-11 PROCEDURE — 90732 PPSV23 VACC 2 YRS+ SUBQ/IM: CPT | Performed by: FAMILY MEDICINE

## 2020-02-11 RX ORDER — INFLUENZA VACCINE, ADJUVANTED 15; 15; 15 UG/.5ML; UG/.5ML; UG/.5ML
INJECTION, SUSPENSION INTRAMUSCULAR
Refills: 0 | COMMUNITY
Start: 2019-10-03 | End: 2020-02-11

## 2020-02-11 RX ORDER — ATENOLOL AND CHLORTHALIDONE TABLET 50; 25 MG/1; MG/1
1 TABLET ORAL DAILY
Qty: 90 TABLET | Refills: 3 | Status: SHIPPED | OUTPATIENT
Start: 2020-02-11 | End: 2021-02-16

## 2020-02-11 RX ORDER — TRIAMCINOLONE ACETONIDE 5 MG/G
CREAM TOPICAL 2 TIMES DAILY
Qty: 454 G | Refills: 0 | Status: SHIPPED | OUTPATIENT
Start: 2020-02-11 | End: 2023-08-29

## 2020-02-11 ASSESSMENT — ENCOUNTER SYMPTOMS
FREQUENCY: 0
WEAKNESS: 0
DYSURIA: 0
SORE THROAT: 0
COUGH: 0
HEMATURIA: 0
ARTHRALGIAS: 1
EYE PAIN: 0
SHORTNESS OF BREATH: 0
ABDOMINAL PAIN: 0
HEMATOCHEZIA: 0
PARESTHESIAS: 0
DIARRHEA: 0
NERVOUS/ANXIOUS: 0
MYALGIAS: 0
JOINT SWELLING: 1
DIZZINESS: 0
HEADACHES: 0
CONSTIPATION: 0
PALPITATIONS: 0
HEARTBURN: 0
FEVER: 0
NAUSEA: 0
CHILLS: 0

## 2020-02-11 ASSESSMENT — ACTIVITIES OF DAILY LIVING (ADL): CURRENT_FUNCTION: NO ASSISTANCE NEEDED

## 2020-02-11 NOTE — PROGRESS NOTES
"SUBJECTIVE:   Cezar Ward is a 69 year old male who presents for Preventive Visit.    Are you in the first 12 months of your Medicare coverage?  No    Healthy Habits:     In general, how would you rate your overall health?  Excellent    Frequency of exercise:  2-3 days/week    Duration of exercise:  45-60 minutes    Do you usually eat at least 4 servings of fruit and vegetables a day, include whole grains    & fiber and avoid regularly eating high fat or \"junk\" foods?  Yes    Taking medications regularly:  Yes    Medication side effects:  Not applicable and None    Ability to successfully perform activities of daily living:  No assistance needed    Home Safety:  No safety concerns identified    Hearing Impairment:  No hearing concerns    In the past 6 months, have you been bothered by leaking of urine?  No    In general, how would you rate your overall mental or emotional health?  Excellent      PHQ-2 Total Score: 0    Additional concerns today:  Yes    Do you feel safe in your environment? Yes    Have you ever done Advance Care Planning? (For example, a Health Directive, POLST, or a discussion with a medical provider or your loved ones about your wishes): No, advance care planning information given to patient to review.  Patient plans to discuss their wishes with loved ones or provider.      Fall risk  Fallen 2 or more times in the past year?: No  Any fall with injury in the past year?: No  click delete button to remove this line now    Cognitive Screening   1) Repeat 3 items (Leader, Season, Table)    2) Clock draw: NORMAL  3) 3 item recall: Recalls 1 objects   Results: ABNORMAL clock, 1-2 items recalled: PROBABLE COGNITIVE IMPAIRMENT, **INFORM PROVIDER**     Patient reports has no memory concerns, was not paying attention    Mini-CogTM Copyright [employer] Licensed by the author for use in [facility]; reprinted with permission (sis@.[email or web address]). All rights reserved.      Concerns:  1. Collagen: saw " sports medicine for lt Knee pain with leg swelling, sent to p[hysical therapy did not seem to help. Starting taking collagen seemed to help. Takes a powder.    2. Upper body exercise    Makes him more fatigued or goes to hot tab, if stays very long will feel light headed    EKG 6/20/2013  Interpretation Normal sinus rhythm  Nonspecific T wave abnormality  Prolonged QT  Abnormal ECG  When compared with ECG of 08-JUN-2007 17:11,  Sinus rhythm has replaced Atrial fibrillation  Vent. rate has decreased BY  57 BPM  ST no longer depressed in Anterolateral leads  Nonspecific T wave abnormality now evident in Inferior leads     Ventricular Rate 68 BPM   Atrial Rate 68 BPM   P-R Interval 182 ms   QRS Duration 106 ms    ms   QTc 487 ms   P Axis -5 degrees   R Axis -15 degrees   T Axis -3 degrees     3. Vit D check:    4.  Pruritis on and off    Starts anywhere and moves around, when scratches then gets worse.    Does a lot of gardening, last few years if gets mosquitoes entire body is hypersensitive.    No rash or hives    Wants cream that       Flu shot: [facility] 10/2019  Shingles: takes   PCV 23:  Colonoscopy: [facility] through 2/28/2019; has repeat in 1 year    Do you have sleep apnea, excessive snoring or daytime drowsiness?: no    Reviewed and updated as needed this visit by Provider        Social History     Tobacco Use     Smoking status: Never Smoker     Smokeless tobacco: Never Used   Substance Use Topics     Alcohol use: Yes     If you drink alcohol do you typically have >3 drinks per day or >7 drinks per week? Yes      Alcohol Use 2/11/2020   Prescreen: >3 drinks/day or >7 drinks/week? No   Prescreen: >3 drinks/day or >7 drinks/week? -   No flowsheet data found.    Current providers sharing in care for this patient include:   Patient Care Team:  Juan Carlisle MD as PCP - General  Cory Esquivel MD as Assigned PCP    The following health maintenance items are reviewed in Epic and  correct as of today:  Health Maintenance   Topic Date Due     HEPATITIS C SCREENING  1950     COLONOSCOPY  03/01/2015     MEDICARE ANNUAL WELLNESS VISIT  08/24/2015     AORTIC ANEURYSM SCREENING (SYSTEM ASSIGNED)  08/24/2015     FALL RISK ASSESSMENT  06/28/2020     PNEUMOCOCCAL IMMUNIZATION 65+ LOW/MEDIUM RISK (2 of 2 - PCV13) 02/11/2021     LIPID  02/11/2025     ADVANCE CARE PLANNING  02/11/2025     DTAP/TDAP/TD IMMUNIZATION (4 - Td) 05/06/2026     PHQ-2  Completed     INFLUENZA VACCINE  Completed     ZOSTER IMMUNIZATION  Completed     IPV IMMUNIZATION  Aged Out     MENINGITIS IMMUNIZATION  Aged Out     Patient Active Problem List   Diagnosis   (none) - all problems resolved or deleted     Past Surgical History:   Procedure Laterality Date     TONSILLECTOMY  1959       Social History     Tobacco Use     Smoking status: Never Smoker     Smokeless tobacco: Never Used   Substance Use Topics     Alcohol use: Yes     Family History   Problem Relation Age of Onset     Hypertension Mother      Hypertension Father      Unknown/Adopted Maternal Grandmother      Unknown/Adopted Maternal Grandfather      Unknown/Adopted Paternal Grandmother      Unknown/Adopted Paternal Grandfather          Pneumonia Vaccine:Adults age 65+ who received Pneumovax (PPSV23) at 65 years or older: Should be given PCV13 > 1 year after their most recent PPSV23    Review of Systems   Constitutional: Negative for chills and fever.   HENT: Negative for congestion, ear pain, hearing loss and sore throat.    Eyes: Negative for pain and visual disturbance.   Respiratory: Negative for cough and shortness of breath.    Cardiovascular: Positive for peripheral edema. Negative for chest pain and palpitations.   Gastrointestinal: Negative for abdominal pain, constipation, diarrhea, heartburn, hematochezia and nausea.   Genitourinary: Positive for urgency. Negative for dysuria, frequency, genital sores and hematuria.   Musculoskeletal: Positive for  "arthralgias and joint swelling. Negative for myalgias.   Skin: Negative for rash.   Neurological: Negative for dizziness, weakness, headaches and paresthesias.   Psychiatric/Behavioral: The patient is not nervous/anxious.      OBJECTIVE:   /80   Pulse 75   Temp 97.3  F (36.3  C) (Oral)   Wt 86.6 kg (191 lb)   SpO2 99%   BMI 27.41 kg/m   Estimated body mass index is 27.41 kg/m  as calculated from the following:    Height as of 10/31/19: 1.778 m (5' 10\").    Weight as of this encounter: 86.6 kg (191 lb).  Physical Exam  GENERAL: healthy, alert and no distress  EYES: Eyes grossly normal to inspection, PERRL and conjunctivae and sclerae normal  HENT: ear canals and TM's normal, nose and mouth without ulcers or lesions  NECK: no adenopath and thyroid normal to palpation  RESP: lungs clear to auscultation - no rales, rhonchi or wheezes  CV: regular rate and rhythm, normal S1 S2, no S3 or S4, no murmur, click or rub, no peripheral edema   ABDOMEN: soft, nontender,  no masses and bowel sounds normal  MS: no gross musculoskeletal defects noted, no edema  SKIN: no suspicious lesions or rashes  NEURO: Normal strength and tone, mentation intact and speech normal  PSYCH: mentation appears normal, affect normal/bright    Diagnostic Test Results:  Labs reviewed in Epic    ASSESSMENT / PLAN:   Cezar was seen today for wellness visit, knee pain, fatigue, dizziness and derm problem.    Diagnoses and all orders for this visit:    Encounter for Medicare annual wellness exam  -     Lipid panel reflex to direct LDL Fasting  -     PSA, screen  -     PPSV23, IM/SUBQ (2+ YRS) - [id number]    HTN, goal below 140/90  -     atenolol-chlorthalidone (TENORETIC) 50-25 MG tablet; Take 1 tablet by mouth daily  -     Comprehensive metabolic panel (BMP + Alb, Alk Phos, ALT, AST, Total. Bili, TP)    Dermatitis  -     triamcinolone (ARISTOCORT HP) 0.5 % external cream; Apply topically 2 times daily    Hemorrhoids, unspecified hemorrhoid " "type    Screening for prostate cancer  -     PSA, screen    Fatigue, unspecified type     Unexplained; if persist and blood work not yielding will consider cardiac evaluation echo/stress test  -     Comprehensive metabolic panel (BMP + Alb, Alk Phos, ALT, AST, Total. Bili, TP)  -     Vitamin D Deficiency  -     CBC with platelets differential    Vitamin D deficiency, unspecified   -     Vitamin D Deficiency      COUNSELING:  Reviewed preventive health counseling, as reflected in patient instructions       Regular exercise       Healthy diet/nutrition       Fall risk prevention       Immunizations    Vaccinated for: Pneumococcal           Prostate cancer screening    Estimated body mass index is 27.41 kg/m  as calculated from the following:    Height as of 10/31/19: 1.778 m (5' 10\").    Weight as of this encounter: 86.6 kg (191 lb).    Weight management plan: Discussed healthy diet and exercise guidelines     reports that he has never smoked. He has never used smokeless tobacco.      Appropriate preventive services were discussed with this patient, including applicable screening as appropriate for cardiovascular disease, diabetes, osteopenia/osteoporosis, and glaucoma.  As appropriate for age/gender, discussed screening for colorectal cancer, prostate cancer, breast cancer, and cervical cancer. Checklist reviewing preventive services available has been given to the patient.    Reviewed patients plan of care and provided an AVS. The Basic Care Plan (routine screening as documented in Health Maintenance) for Cezar meets the Care Plan requirement. This Care Plan has been established and reviewed with the Patient.    Counseling Resources:  ATP IV Guidelines  Pooled Cohorts Equation Calculator  Breast Cancer Risk Calculator  FRAX Risk Assessment  ICSI Preventive Guidelines  Dietary Guidelines for Americans, 2010  USDA's MyPlate  ASA Prophylaxis  Lung CA Screening    Cory Esquivel MD  [facility] " [facility]    Identified Health Risks:

## 2020-02-12 LAB — DEPRECATED CALCIDIOL+CALCIFEROL SERPL-MC: 67 UG/L (ref 20–75)

## 2020-12-12 ENCOUNTER — HEALTH MAINTENANCE LETTER (OUTPATIENT)
Age: 70
End: 2020-12-12

## 2021-02-16 ENCOUNTER — OFFICE VISIT (OUTPATIENT)
Dept: FAMILY MEDICINE | Facility: CLINIC | Age: 71
End: 2021-02-16
Payer: COMMERCIAL

## 2021-02-16 VITALS
BODY MASS INDEX: 27.14 KG/M2 | TEMPERATURE: 97.3 F | SYSTOLIC BLOOD PRESSURE: 110 MMHG | RESPIRATION RATE: 20 BRPM | OXYGEN SATURATION: 100 % | HEIGHT: 70 IN | HEART RATE: 62 BPM | WEIGHT: 189.6 LBS | DIASTOLIC BLOOD PRESSURE: 60 MMHG

## 2021-02-16 DIAGNOSIS — E87.6 HYPOKALEMIA: Primary | ICD-10-CM

## 2021-02-16 DIAGNOSIS — E78.5 HYPERLIPIDEMIA, UNSPECIFIED HYPERLIPIDEMIA TYPE: ICD-10-CM

## 2021-02-16 DIAGNOSIS — Z11.59 ENCOUNTER FOR HEPATITIS C SCREENING TEST FOR LOW RISK PATIENT: ICD-10-CM

## 2021-02-16 DIAGNOSIS — I10 HTN, GOAL BELOW 140/90: ICD-10-CM

## 2021-02-16 PROCEDURE — 86803 HEPATITIS C AB TEST: CPT | Performed by: FAMILY MEDICINE

## 2021-02-16 PROCEDURE — 80061 LIPID PANEL: CPT | Performed by: FAMILY MEDICINE

## 2021-02-16 PROCEDURE — 99213 OFFICE O/P EST LOW 20 MIN: CPT | Performed by: FAMILY MEDICINE

## 2021-02-16 PROCEDURE — 80048 BASIC METABOLIC PNL TOTAL CA: CPT | Performed by: FAMILY MEDICINE

## 2021-02-16 PROCEDURE — 36415 COLL VENOUS BLD VENIPUNCTURE: CPT | Performed by: FAMILY MEDICINE

## 2021-02-16 RX ORDER — TRIAMCINOLONE ACETONIDE 5 MG/G
CREAM TOPICAL 2 TIMES DAILY
Qty: 454 G | Refills: 0 | Status: CANCELLED | OUTPATIENT
Start: 2021-02-16

## 2021-02-16 RX ORDER — ATENOLOL AND CHLORTHALIDONE TABLET 50; 25 MG/1; MG/1
1 TABLET ORAL DAILY
Qty: 90 TABLET | Refills: 3 | Status: SHIPPED | OUTPATIENT
Start: 2021-02-16 | End: 2022-02-15

## 2021-02-16 ASSESSMENT — MIFFLIN-ST. JEOR: SCORE: 1626.27

## 2021-02-16 NOTE — PROGRESS NOTES
"    Assessment & Plan     HTN, goal below 140/90  Controlled  Labs pending   - atenolol-chlorthalidone (TENORETIC) 50-25 MG tablet; Take 1 tablet by mouth daily  - Basic metabolic panel  - Lipid panel reflex to direct LDL Non-fasting    Hyperlipidemia, unspecified hyperlipidemia type  Labs pending     Encounter for hepatitis C screening test for low risk patient  Advised   - **Hepatitis C Screen Reflex to RNA FUTURE anytime  BMI:   Estimated body mass index is 27.2 kg/m  as calculated from the following:    Height as of this encounter: 1.778 m (5' 10\").    Weight as of this encounter: 86 kg (189 lb 9.6 oz).       Work on weight loss  Regular exercise    Return in about 2 months (around 4/16/2021) for Medicare wellness Exam.    Darlene Castaneda MD  [facility] is a 70 year old who presents for the following health issues  accompanied by his self:    HPI       Hypertension Follow-up      Do you check your blood pressure regularly outside of the clinic? Yes once in a while    Are you following a low salt diet? Yes    Are your blood pressures ever more than 140 on the top number (systolic) OR more   than 90 on the bottom number (diastolic), for example 140/90? Yes sometimes      How many servings of fruits and vegetables do you eat daily?  2-3    On average, how many sweetened beverages do you drink each day (Examples: soda, juice, sweet tea, etc.  Do NOT count diet or artificially sweetened beverages)?   1 glass in morning sometimes    How many days per week do you exercise enough to make your heart beat faster? 3 or less    How many minutes a day do you exercise enough to make your heart beat faster? 10 - 19    How many days per week do you miss taking your medication? 0  Review of Systems   CONSTITUTIONAL: NEGATIVE for fever, chills, change in weight  ENT/MOUTH: NEGATIVE for ear, mouth and throat problems  RESP: NEGATIVE for significant cough or SOB  CV: NEGATIVE for chest " "pain, palpitations or peripheral edema  GI: NEGATIVE for nausea, abdominal pain, heartburn, or change in bowel habits  ROS otherwise negative      Objective    /60   Pulse 62   Temp 97.3  F (36.3  C) (Tympanic)   Resp 20   Ht 1.778 m (5' 10\")   Wt 86 kg (189 lb 9.6 oz)   SpO2 100%   BMI 27.20 kg/m    Body mass index is 27.2 kg/m .  Physical Exam   GENERAL: healthy, alert and no distress  NECK: no adenopathy, no asymmetry, masses, or scars and thyroid normal to palpation  RESP: lungs clear to auscultation - no rales, rhonchi or wheezes  CV: regular rate and rhythm, normal S1 S2, no S3 or S4, no murmur, click or rub, no peripheral edema and peripheral pulses strong  ABDOMEN: soft, nontender, no hepatosplenomegaly, no masses and bowel sounds normal  MS: no gross musculoskeletal defects noted, no edema    Pending     "

## 2021-02-17 LAB
ANION GAP SERPL CALCULATED.3IONS-SCNC: 5 MMOL/L (ref 3–14)
BUN SERPL-MCNC: 19 MG/DL (ref 7–30)
CALCIUM SERPL-MCNC: 9.7 MG/DL (ref 8.5–10.1)
CHLORIDE SERPL-SCNC: 101 MMOL/L (ref 94–109)
CHOLEST SERPL-MCNC: 226 MG/DL
CO2 SERPL-SCNC: 32 MMOL/L (ref 20–32)
CREAT SERPL-MCNC: 0.95 MG/DL (ref 0.66–1.25)
GFR SERPL CREATININE-BSD FRML MDRD: 80 ML/MIN/{1.73_M2}
GLUCOSE SERPL-MCNC: 89 MG/DL (ref 70–99)
HCV AB SERPL QL IA: NONREACTIVE
HDLC SERPL-MCNC: 41 MG/DL
LDLC SERPL CALC-MCNC: 121 MG/DL
NONHDLC SERPL-MCNC: 185 MG/DL
POTASSIUM SERPL-SCNC: 3.3 MMOL/L (ref 3.4–5.3)
SODIUM SERPL-SCNC: 138 MMOL/L (ref 133–144)
TRIGL SERPL-MCNC: 319 MG/DL

## 2021-02-17 RX ORDER — PRAVASTATIN SODIUM 20 MG
20 TABLET ORAL DAILY
Qty: 30 TABLET | Refills: 11 | Status: SHIPPED | OUTPATIENT
Start: 2021-02-17

## 2021-02-18 ENCOUNTER — TELEPHONE (OUTPATIENT)
Dept: FAMILY MEDICINE | Facility: CLINIC | Age: 71
End: 2021-02-18

## 2021-02-18 NOTE — TELEPHONE ENCOUNTER
Pt returned call. I relayed below message. He had no further questions. Stated he would call back to schedule labs.     Zahraa James RN

## 2021-02-18 NOTE — TELEPHONE ENCOUNTER
Left message on answering machine for patient to call back to the nurse at [phone].    Zahraa James RN  [facility]       The 10-year ASCVD risk score ([place] ASENCIO Jr., et al., 2013) is: 18.9%     Please start Pravachol 20 mg daily   Follow Low cholesterol diet-mail   If any Muscle aches -Please let me Know   Follow up cholesterol check in 6 weeks   Potassium is Low   Eat High Potassium food       Potassium rich foods are-   Apricots, Avocados, Bananas, Beets, Brussel sprouts, Cantaloupe, Dates and Figs, Kiwi, Lima beans, honeydew Melons, Milk, Nectarines, Oranges or Orange juice, pears,   peanuts, potatoes, prunes, raisins, spinach, tomatoes or tomato products, squash, or yogurt   Follow up Potassium check in 1 weeks   Sincerely,   Darlene Castaneda MD

## 2021-04-11 ENCOUNTER — HEALTH MAINTENANCE LETTER (OUTPATIENT)
Age: 71
End: 2021-04-11

## 2021-05-05 ENCOUNTER — OFFICE VISIT (OUTPATIENT)
Dept: OPHTHALMOLOGY | Facility: CLINIC | Age: 71
End: 2021-05-05
Payer: COMMERCIAL

## 2021-05-05 DIAGNOSIS — H52.223 REGULAR ASTIGMATISM OF BOTH EYES: ICD-10-CM

## 2021-05-05 DIAGNOSIS — Z01.00 EXAMINATION OF EYES AND VISION: Primary | ICD-10-CM

## 2021-05-05 DIAGNOSIS — H52.13 MYOPIA OF BOTH EYES: ICD-10-CM

## 2021-05-05 DIAGNOSIS — H52.4 PRESBYOPIA: ICD-10-CM

## 2021-05-05 PROCEDURE — 92015 DETERMINE REFRACTIVE STATE: CPT | Performed by: STUDENT IN AN ORGANIZED HEALTH CARE EDUCATION/TRAINING PROGRAM

## 2021-05-05 PROCEDURE — 92004 COMPRE OPH EXAM NEW PT 1/>: CPT | Performed by: STUDENT IN AN ORGANIZED HEALTH CARE EDUCATION/TRAINING PROGRAM

## 2021-05-05 ASSESSMENT — VISUAL ACUITY
METHOD: SNELLEN - LINEAR
OS_SC: 20/25
OS_SC+: -1
OD_SC: 20/30SLOW

## 2021-05-05 ASSESSMENT — CONF VISUAL FIELD
OS_NORMAL: 1
OD_NORMAL: 1
METHOD: COUNTING FINGERS

## 2021-05-05 ASSESSMENT — SLIT LAMP EXAM - LIDS: COMMENTS: NORMAL

## 2021-05-05 ASSESSMENT — REFRACTION_MANIFEST
OS_CYLINDER: +0.75
OD_AXIS: 030
OS_ADD: +2.75
OD_CYLINDER: +1.00
OD_SPHERE: -1.25
OS_AXIS: 150
OS_SPHERE: -0.75
OD_ADD: +2.75

## 2021-05-05 ASSESSMENT — TONOMETRY
OD_IOP_MMHG: 17
IOP_METHOD: APPLANATION
OS_IOP_MMHG: 17

## 2021-05-05 ASSESSMENT — EXTERNAL EXAM - RIGHT EYE: OD_EXAM: NORMAL

## 2021-05-05 ASSESSMENT — EXTERNAL EXAM - LEFT EYE: OS_EXAM: NORMAL

## 2021-05-05 ASSESSMENT — CUP TO DISC RATIO
OD_RATIO: 0.2
OS_RATIO: 0.2

## 2021-05-05 NOTE — LETTER
5/5/2021         RE: Cezar Ward  [address] [address]        Dear Colleague,    Thank you for referring your patient, Cezar Ward, to the [facility]. Please see a copy of my visit note below.     Current Eye Medications:  None     Subjective:  Complete eye exam. Vision is doing pretty well both eyes in the distance. Having a little trouble at near for last 10 years. Patient lost glasses about 7 years ago, and thought he saw better with out them, so never replaced. Sometimes uses OTC readers, not all the time. No eye pain or discomfort in either eye.     No previous eye injuries or surgeries.     Objective:  See Ophthalmology Exam.      Assessment:  Cezar Ward is a 70 year old male who presents with:   Encounter Diagnoses   Name Primary?     Examination of eyes and vision      Myopia of both eyes      Regular astigmatism of both eyes      Presbyopia        Plan: Glasses prescription given - optional to fill - ok to continue using over the counter readers    Michele Moise MD  [phone]          Again, thank you for allowing me to participate in the care of your patient.        Sincerely,        Michele Moise MD

## 2021-05-05 NOTE — PATIENT INSTRUCTIONS
Glasses prescription given - optional to fill - ok to continue using over the counter readers    Michele Moise MD  [phone]

## 2021-05-05 NOTE — PROGRESS NOTES
Current Eye Medications:  None     Subjective:  Complete eye exam. Vision is doing pretty well both eyes in the distance. Having a little trouble at near for last 10 years. Patient lost glasses about 7 years ago, and thought he saw better with out them, so never replaced. Sometimes uses OTC readers, not all the time. No eye pain or discomfort in either eye.     No previous eye injuries or surgeries.     Objective:  See Ophthalmology Exam.      Assessment:  Cezar Ward is a 70 year old male who presents with:   Encounter Diagnoses   Name Primary?     Examination of eyes and vision      Myopia of both eyes      Regular astigmatism of both eyes      Presbyopia        Plan: Glasses prescription given - optional to fill - ok to continue using over the counter readers    Michele Moise MD  [phone]

## 2021-09-26 ENCOUNTER — HEALTH MAINTENANCE LETTER (OUTPATIENT)
Age: 71
End: 2021-09-26

## 2022-02-15 ENCOUNTER — VIRTUAL VISIT (OUTPATIENT)
Dept: FAMILY MEDICINE | Facility: CLINIC | Age: 72
End: 2022-02-15
Payer: COMMERCIAL

## 2022-02-15 DIAGNOSIS — I10 HTN, GOAL BELOW 140/90: Primary | ICD-10-CM

## 2022-02-15 PROBLEM — I48.11 LONGSTANDING PERSISTENT ATRIAL FIBRILLATION (H): Status: ACTIVE | Noted: 2022-02-15

## 2022-02-15 PROCEDURE — 99213 OFFICE O/P EST LOW 20 MIN: CPT | Mod: 95 | Performed by: PHYSICIAN ASSISTANT

## 2022-02-15 RX ORDER — ATENOLOL 50 MG/1
50 TABLET ORAL DAILY
Qty: 30 TABLET | Refills: 1 | Status: SHIPPED | OUTPATIENT
Start: 2022-02-15 | End: 2022-04-15

## 2022-02-15 RX ORDER — ATENOLOL AND CHLORTHALIDONE TABLET 50; 25 MG/1; MG/1
1 TABLET ORAL DAILY
Qty: 90 TABLET | Refills: 3 | Status: SHIPPED | OUTPATIENT
Start: 2022-02-15 | End: 2022-02-15 | Stop reason: ALTCHOICE

## 2022-02-15 NOTE — PROGRESS NOTES
"Cezar is a 71 year old who is being evaluated via a billable telephone visit.      What phone number would you like to be contacted at? [phone]  How would you like to [facility] your [facility]? [facility]    Assessment & Plan     HTN, goal below 140/90  - atenolol (TENORMIN) 50 MG tablet; Take 1 tablet (50 mg) by mouth daily    [id number]}     BMI:   Estimated body mass index is 27.2 kg/m  as calculated from the following:    Height as of 2/16/21: 1.778 m (5' 10\").    Weight as of 2/16/21: 86 kg (189 lb 9.6 oz).       Return in about 4 weeks (around 3/15/2022) for Follow up, for labs, Routine preventive.    Nicholas Wilson PA-C  [facility]    Peter Robb is a 71 year old who presents for the following health issues  accompanied by his self.    HPI     Hypertension Follow-up      Do you check your blood pressure regularly outside of the clinic? Yes-once in a while    Are you following a low salt diet? Yes- no added salt    Are your blood pressures ever more than 140 on the top number (systolic) OR more   than 90 on the bottom number (diastolic), for example 140/90? No      How many servings of fruits and vegetables do you eat daily?  2-3    On average, how many sweetened beverages do you drink each day (Examples: soda, juice, sweet tea, etc.  Do NOT count diet or artificially sweetened beverages)?   0    How many days per week do you exercise enough to make your heart beat faster? 4    How many minutes a day do you exercise enough to make your heart beat faster? 30 - 60    How many days per week do you miss taking your medication? 0    Patient has dizziness and hypokalemia.  Amenable to discontinue of chlorthalidone as his BPs have been in the normal range for several months.  Will continue home monitoring and follow up in 1 month for CPE and labs with recheck.     Review of Systems   Constitutional, HEENT, cardiovascular, pulmonary, gi and gu systems are negative, except as otherwise " noted.      Objective    Vitals - Patient Reported  Systolic (Patient Reported): (!) 140  Diastolic (Patient Reported): 84  Pulse (Patient Reported): 64      Vitals:  No vitals were obtained today due to virtual visit.    Physical Exam   healthy, alert and no distress  PSYCH: Alert and oriented times 3; coherent speech, normal   rate and volume, able to articulate logical thoughts, able   to abstract reason, no tangential thoughts, no hallucinations   or delusions  His affect is normal  RESP: No cough, no audible wheezing, able to talk in full sentences  Remainder of exam unable to be completed due to telephone visits                Phone call duration: 13 minutes

## 2022-04-14 DIAGNOSIS — I10 HTN, GOAL BELOW 140/90: ICD-10-CM

## 2022-04-15 RX ORDER — ATENOLOL 50 MG/1
50 TABLET ORAL DAILY
Qty: 90 TABLET | Refills: 0 | Status: SHIPPED | OUTPATIENT
Start: 2022-04-15 | End: 2022-05-11

## 2022-04-15 NOTE — TELEPHONE ENCOUNTER
Prescription approved per [facility] Refill Protocol. Patient has appointment with Dr. Castaneda scheduled for 5/11/22.     Valentina Klein RN   [facility]

## 2022-05-08 ENCOUNTER — HEALTH MAINTENANCE LETTER (OUTPATIENT)
Age: 72
End: 2022-05-08

## 2022-05-11 ENCOUNTER — OFFICE VISIT (OUTPATIENT)
Dept: FAMILY MEDICINE | Facility: CLINIC | Age: 72
End: 2022-05-11
Payer: COMMERCIAL

## 2022-05-11 VITALS
TEMPERATURE: 98 F | OXYGEN SATURATION: 100 % | DIASTOLIC BLOOD PRESSURE: 86 MMHG | SYSTOLIC BLOOD PRESSURE: 138 MMHG | HEART RATE: 59 BPM | WEIGHT: 164.6 LBS | BODY MASS INDEX: 24.95 KG/M2 | HEIGHT: 68 IN

## 2022-05-11 DIAGNOSIS — Z23 HIGH PRIORITY FOR 2019-NCOV VACCINE: ICD-10-CM

## 2022-05-11 DIAGNOSIS — E78.5 HYPERLIPIDEMIA, UNSPECIFIED HYPERLIPIDEMIA TYPE: ICD-10-CM

## 2022-05-11 DIAGNOSIS — Z00.00 ENCOUNTER FOR MEDICARE ANNUAL WELLNESS EXAM: ICD-10-CM

## 2022-05-11 DIAGNOSIS — E55.9 VITAMIN D DEFICIENCY: ICD-10-CM

## 2022-05-11 DIAGNOSIS — R39.15 URINARY URGENCY: ICD-10-CM

## 2022-05-11 DIAGNOSIS — I10 HTN, GOAL BELOW 140/90: ICD-10-CM

## 2022-05-11 DIAGNOSIS — Z12.5 SCREENING FOR PROSTATE CANCER: ICD-10-CM

## 2022-05-11 LAB
ALBUMIN UR-MCNC: NEGATIVE MG/DL
ANION GAP SERPL CALCULATED.3IONS-SCNC: 6 MMOL/L (ref 3–14)
APPEARANCE UR: CLEAR
BILIRUB UR QL STRIP: NEGATIVE
BUN SERPL-MCNC: 14 MG/DL (ref 7–30)
CALCIUM SERPL-MCNC: 9.5 MG/DL (ref 8.5–10.1)
CHLORIDE BLD-SCNC: 108 MMOL/L (ref 94–109)
CHOLEST SERPL-MCNC: 185 MG/DL
CO2 SERPL-SCNC: 27 MMOL/L (ref 20–32)
COLOR UR AUTO: YELLOW
CREAT SERPL-MCNC: 0.9 MG/DL (ref 0.66–1.25)
DEPRECATED CALCIDIOL+CALCIFEROL SERPL-MC: 70 UG/L (ref 20–75)
FASTING STATUS PATIENT QL REPORTED: YES
GFR SERPL CREATININE-BSD FRML MDRD: >90 ML/MIN/1.73M2
GLUCOSE BLD-MCNC: 93 MG/DL (ref 70–99)
GLUCOSE UR STRIP-MCNC: NEGATIVE MG/DL
HDLC SERPL-MCNC: 62 MG/DL
HGB UR QL STRIP: NEGATIVE
KETONES UR STRIP-MCNC: NEGATIVE MG/DL
LDLC SERPL CALC-MCNC: 110 MG/DL
LEUKOCYTE ESTERASE UR QL STRIP: NEGATIVE
NITRATE UR QL: NEGATIVE
NONHDLC SERPL-MCNC: 123 MG/DL
PH UR STRIP: 5.5 [PH] (ref 5–7)
POTASSIUM BLD-SCNC: 4.1 MMOL/L (ref 3.4–5.3)
PSA SERPL-MCNC: 1.65 UG/L (ref 0–4)
SODIUM SERPL-SCNC: 141 MMOL/L (ref 133–144)
SP GR UR STRIP: >=1.03 (ref 1–1.03)
TRIGL SERPL-MCNC: 65 MG/DL
UROBILINOGEN UR STRIP-ACNC: 0.2 E.U./DL

## 2022-05-11 PROCEDURE — G0103 PSA SCREENING: HCPCS | Performed by: FAMILY MEDICINE

## 2022-05-11 PROCEDURE — 99213 OFFICE O/P EST LOW 20 MIN: CPT | Mod: 25 | Performed by: FAMILY MEDICINE

## 2022-05-11 PROCEDURE — 82306 VITAMIN D 25 HYDROXY: CPT | Performed by: FAMILY MEDICINE

## 2022-05-11 PROCEDURE — 36415 COLL VENOUS BLD VENIPUNCTURE: CPT | Performed by: FAMILY MEDICINE

## 2022-05-11 PROCEDURE — 80048 BASIC METABOLIC PNL TOTAL CA: CPT | Performed by: FAMILY MEDICINE

## 2022-05-11 PROCEDURE — G0438 PPPS, INITIAL VISIT: HCPCS | Performed by: FAMILY MEDICINE

## 2022-05-11 PROCEDURE — 0064A COVID-19,PF,MODERNA (18+ YRS BOOSTER .25ML): CPT | Performed by: FAMILY MEDICINE

## 2022-05-11 PROCEDURE — 80061 LIPID PANEL: CPT | Performed by: FAMILY MEDICINE

## 2022-05-11 PROCEDURE — 81003 URINALYSIS AUTO W/O SCOPE: CPT | Performed by: FAMILY MEDICINE

## 2022-05-11 PROCEDURE — 91306 COVID-19,PF,MODERNA (18+ YRS BOOSTER .25ML): CPT | Performed by: FAMILY MEDICINE

## 2022-05-11 RX ORDER — ATENOLOL 50 MG/1
50 TABLET ORAL DAILY
Qty: 90 TABLET | Refills: 3 | Status: SHIPPED | OUTPATIENT
Start: 2022-05-11 | End: 2023-07-17

## 2022-05-11 ASSESSMENT — PAIN SCALES - GENERAL: PAINLEVEL: NO PAIN (0)

## 2022-05-11 ASSESSMENT — ENCOUNTER SYMPTOMS
SHORTNESS OF BREATH: 0
DIZZINESS: 0
ARTHRALGIAS: 0
CONSTIPATION: 1
DIARRHEA: 0
JOINT SWELLING: 0
PARESTHESIAS: 0
MYALGIAS: 0
FREQUENCY: 1
HEARTBURN: 0
SORE THROAT: 0
ABDOMINAL PAIN: 0
NERVOUS/ANXIOUS: 0
NAUSEA: 0
HEADACHES: 0
EYE PAIN: 0
FEVER: 0
WEAKNESS: 0
COUGH: 0
PALPITATIONS: 0
HEMATURIA: 0
HEMATOCHEZIA: 0
CHILLS: 0
DYSURIA: 0

## 2022-05-11 ASSESSMENT — ACTIVITIES OF DAILY LIVING (ADL): CURRENT_FUNCTION: NO ASSISTANCE NEEDED

## 2022-05-11 NOTE — PATIENT INSTRUCTIONS
Patient Education   Personalized Prevention Plan  You are due for the preventive services outlined below.  Your care team is available to assist you in scheduling these services.  If you have already completed any of these items, please share that information with your care team to update in your medical record.  Health Maintenance Due   Topic Date Due     AORTIC ANEURYSM SCREENING (SYSTEM ASSIGNED)  Never done     FALL RISK ASSESSMENT  02/11/2021     Pneumococcal Vaccine (2 - PCV) 02/11/2021

## 2022-05-11 NOTE — PROGRESS NOTES
"SUBJECTIVE:   Cezar Ward is a 71 year old male who presents for Preventive Visit.      Patient has been advised of split billing requirements and indicates understanding: Yes  Are you in the first 12 months of your Medicare coverage?  No    Healthy Habits:     In general, how would you rate your overall health?  Good    Frequency of exercise:  4-5 days/week    Duration of exercise:  30-45 minutes    Do you usually eat at least 4 servings of fruit and vegetables a day, include whole grains    & fiber and avoid regularly eating high fat or \"junk\" foods?  Yes    Taking medications regularly:  Yes    Medication side effects:  None    Ability to successfully perform activities of daily living:  No assistance needed    Home Safety:  No safety concerns identified    Hearing Impairment:  No hearing concerns    In the past 6 months, have you been bothered by leaking of urine?  No    In general, how would you rate your overall mental or emotional health?  Excellent      PHQ-2 Total Score: 0    Additional concerns today:  Yes    Do you feel safe in your environment? Yes    Have you ever done Advance Care Planning? (For example, a Health Directive, POLST, or a discussion with a medical provider or your loved ones about your wishes): No, advance care planning information given to patient to review.  Advanced care planning was discussed at today's visit.  Fall risk  Fallen 2 or more times in the past year?: No  Any fall with injury in the past year?: No    Cognitive Screening   1) Repeat 3 items (Leader, Season, Table)    2) Clock draw: NORMAL  3) 3 item recall: Recalls 3 objects  Results: 3 items recalled: COGNITIVE IMPAIRMENT LESS LIKELY    Mini-CogTM Copyright [employer] Licensed by the author for use in [facility]; reprinted with permission (sis@.[facility]). All rights reserved.      Do you have sleep apnea, excessive snoring or daytime drowsiness?: no    Reviewed and updated as needed this visit by clinical " staff    Allergies  Meds                Reviewed and updated as needed this visit by Provider                   Social History     Tobacco Use     Smoking status: Never Smoker     Smokeless tobacco: Never Used   Substance Use Topics     Alcohol use: Yes         Alcohol Use 5/11/2022   Prescreen: >3 drinks/day or >7 drinks/week? No   Prescreen: >3 drinks/day or >7 drinks/week? -           Hypertension Follow-up      Do you check your blood pressure regularly outside of the clinic? No     Are you following a low salt diet? Yes    Are your blood pressures ever more than 140 on the top number (systolic) OR more   than 90 on the bottom number (diastolic), for example 140/90? No      Current providers sharing in care for this patient include:   Patient Care Team:  Darlene Castaneda MD as PCP - General (Family Medicine)  Darlene Castaneda MD as Assigned PCP  Michele Moise MD as Assigned Surgical Provider    The following health maintenance items are reviewed in Epic and correct as of today:  Health Maintenance Due   Topic Date Due     AORTIC ANEURYSM SCREENING (SYSTEM ASSIGNED)  Never done     FALL RISK ASSESSMENT  02/11/2021     Pneumococcal Vaccine: 65+ Years (2 - PCV) 02/11/2021     COVID-19 Vaccine (4 - Booster for Moderna series) 03/03/2022     Lab work is in process  Labs reviewed in EPIC  BP Readings from Last 3 Encounters:   05/11/22 138/86   02/16/21 110/60   02/11/20 132/80    Wt Readings from Last 3 Encounters:   05/11/22 74.7 kg (164 lb 9.6 oz)   02/16/21 86 kg (189 lb 9.6 oz)   02/11/20 86.6 kg (191 lb)                  Patient Active Problem List   Diagnosis     Chronic neck pain     Diverticulosis of colon     External hemorrhoids     Vertigo     Past Surgical History:   Procedure Laterality Date     TONSILLECTOMY  1959       Social History     Tobacco Use     Smoking status: Never Smoker     Smokeless tobacco: Never Used   Substance Use Topics     Alcohol use: Yes     Family History   Problem Relation Age  of Onset     Hypertension Mother      Hypertension Father      Unknown/Adopted Maternal Grandmother      Unknown/Adopted Maternal Grandfather      Unknown/Adopted Paternal Grandmother      Unknown/Adopted Paternal Grandfather      Glaucoma No family hx of      Macular Degeneration No family hx of          Current Outpatient Medications   Medication Sig Dispense Refill     atenolol (TENORMIN) 50 MG tablet Take 1 tablet (50 mg) by mouth daily 90 tablet 3     pravastatin (PRAVACHOL) 20 MG tablet Take 1 tablet (20 mg) by mouth daily (Patient not taking: No sig reported) 30 tablet 11     triamcinolone (ARISTOCORT HP) 0.5 % external cream Apply topically 2 times daily (Patient not taking: No sig reported) 454 g 0     Allergies   Allergen Reactions     Lisinopril Swelling     Cipro [Quinolones]      Puts patient to sleep     Hydrochlorothiazide Other (See Comments)     HYPOKALEMIA     Levaquin      Puts patient to sleep     Prednisone Other (See Comments)     Facial edema     Recent Labs   Lab Test 02/16/21  1722 02/11/20  1246   * 106*   HDL 41 46   TRIG 319* 270*   ALT  --  19   CR 0.95 0.92   GFRESTIMATED 80 84   GFRESTBLACK >90 >90   POTASSIUM 3.3* 3.7              Review of Systems   Constitutional: Negative for chills and fever.   HENT: Negative for congestion, ear pain, hearing loss and sore throat.    Eyes: Negative for pain and visual disturbance.   Respiratory: Negative for cough and shortness of breath.    Cardiovascular: Negative for chest pain, palpitations and peripheral edema.   Gastrointestinal: Negative for abdominal pain, diarrhea, heartburn, hematochezia and nausea. Constipation: had colonoscopy recently, has Regular Bowel Movements if he takes metamucil.   Genitourinary: Positive for impotence. Negative for dysuria, genital sores, hematuria and penile discharge. Frequency: is much better now that he has stopped Diuretic. Urgency: when he drinks coffee.   Musculoskeletal: Negative for arthralgias,  "joint swelling and myalgias.   Skin: Negative for rash.   Neurological: Negative for dizziness, weakness, headaches and paresthesias.   Psychiatric/Behavioral: Negative for mood changes. The patient is not nervous/anxious.      Rest of the ROS is Negative except see above and Problem list [stable]      OBJECTIVE:   There were no vitals taken for this visit. Estimated body mass index is 27.2 kg/m  as calculated from the following:    Height as of 2/16/21: 1.778 m (5' 10\").    Weight as of 2/16/21: 86 kg (189 lb 9.6 oz).  Physical Exam  GENERAL: healthy, alert and no distress  EYES: Eyes grossly normal to inspection, PERRL and conjunctivae and sclerae normal  HENT: ear canals and TM's normal, nose and mouth without ulcers or lesions  NECK: no adenopathy, no asymmetry, masses, or scars and thyroid normal to palpation  RESP: lungs clear to auscultation - no rales, rhonchi or wheezes  CV: regular rate and rhythm, normal S1 S2, no S3 or S4, no murmur, click or rub, no peripheral edema and peripheral pulses strong  ABDOMEN: soft, nontender, no hepatosplenomegaly, no masses and bowel sounds normal   (male): pt declined  MS: no gross musculoskeletal defects noted, no edema  SKIN: no suspicious lesions or rashes  NEURO: Normal strength and tone, mentation intact and speech normal  PSYCH: mentation appears normal, affect normal/bright    Diagnostic Test Results:  Labs reviewed in Epic  Pending     ASSESSMENT / PLAN:   (Z00.00) Encounter for Medicare annual wellness exam  Comment:   Plan:     (I10) HTN, goal below 140/90  Comment: pending   Plan: atenolol (TENORMIN) 50 MG tablet, Lipid panel         reflex to direct LDL Fasting, Basic metabolic         panel  (Ca, Cl, CO2, Creat, Gluc, K, Na, BUN)            (E55.9) Vitamin D deficiency  Comment: pending   Plan: Vitamin D Deficiency            (E78.5) Hyperlipidemia, unspecified hyperlipidemia type  Comment: Low chol diet    (R39.15) Urinary urgency  Comment: when h e Drinks " "Coffee otherwise pkay  Plan: UA Macro with Reflex to Micro and Culture - lab        collect            (Z12.5) Screening for prostate cancer  Comment: done  Plan: PSA, screen        Pt will follow up if he has any issues for prostate test    (Z23) High priority for 2019-nCoV vaccine  Comment: discussed   Plan: COVID-19,PF,MODERNA (18+ Yrs BOOSTER .25mL)        For constipation take metamucil daily   If not better follow up 1 month      COUNSELING:  Reviewed preventive health counseling, as reflected in patient instructions       Consider AAA screening for ages 65-75 and smoking history       Regular exercise       Healthy diet/nutrition       Vision screening       Hearing screening       Dental care       Bladder control       Fall risk prevention       Colon cancer screening       Prostate cancer screening    Estimated body mass index is 27.2 kg/m  as calculated from the following:    Height as of 2/16/21: 1.778 m (5' 10\").    Weight as of 2/16/21: 86 kg (189 lb 9.6 oz).        He reports that he has never smoked. He has never used smokeless tobacco.      Appropriate preventive services were discussed with this patient, including applicable screening as appropriate for cardiovascular disease, diabetes, osteopenia/osteoporosis, and glaucoma.  As appropriate for age/gender, discussed screening for colorectal cancer, prostate cancer, breast cancer, and cervical cancer. Checklist reviewing preventive services available has been given to the patient.    Reviewed patients plan of care and provided an AVS. The Intermediate Care Plan ( asthma action plan, low back pain action plan, and migraine action plan) for Cezar meets the Care Plan requirement. This Care Plan has been established and reviewed with the Patient   Pt declined Ultrasound for aneurysm screen .    Counseling Resources:  ATP IV Guidelines  Pooled Cohorts Equation Calculator  Breast Cancer Risk Calculator  Breast Cancer: Medication to Reduce Risk  FRAX Risk " Assessment  ICSI Preventive Guidelines  Dietary Guidelines for Americans, 2010  USDA's MyPlate  ASA Prophylaxis  Lung CA Screening    Darlene Castaneda MD  [facility]    Identified Health Risks:

## 2023-01-08 ENCOUNTER — HEALTH MAINTENANCE LETTER (OUTPATIENT)
Age: 73
End: 2023-01-08

## 2023-07-16 ENCOUNTER — HEALTH MAINTENANCE LETTER (OUTPATIENT)
Age: 73
End: 2023-07-16

## 2023-08-14 DIAGNOSIS — I10 HTN, GOAL BELOW 140/90: ICD-10-CM

## 2023-08-14 RX ORDER — ATENOLOL 50 MG/1
50 TABLET ORAL DAILY
Qty: 30 TABLET | Refills: 0 | Status: SHIPPED | OUTPATIENT
Start: 2023-08-14 | End: 2023-08-29

## 2023-08-14 NOTE — TELEPHONE ENCOUNTER
Medication Question or Refill    Contacts         Type Contact Phone/Fax    08/14/2023 10:53 AM CDT Phone (Incoming) Cezar Ward (Self) [phone] (H)            What medication are you calling about (include dose and sig)?: atenolol (TENORMIN) 50 MG tablet     Preferred Pharmacy:   [facility] [address] [place], [place] [address], [place] - [address], [address]  [address] [place] [address] [address]  [facility] [address]  Phone: [phone] Fax: [phone]      Controlled Substance Agreement on file:   CSA -- Patient Level:    CSA: None found at the patient level.       Who prescribed the medication?: Dr. Castaneda    Do you need a refill? Yes    When did you use the medication last? daily    Patient offered an appointment? Yes: Patient is scheduled 8/29 with Dr. Castaneda    Do you have any questions or concerns?  No      Could we send this information to you in [facility] or would you prefer to receive a phone call?:   Patient would prefer a phone call   Okay to leave a detailed message?: Yes at Cell number on file:    Telephone Information:   Mobile [phone]

## 2023-08-29 ENCOUNTER — OFFICE VISIT (OUTPATIENT)
Dept: FAMILY MEDICINE | Facility: CLINIC | Age: 73
End: 2023-08-29
Payer: COMMERCIAL

## 2023-08-29 VITALS
TEMPERATURE: 97.5 F | DIASTOLIC BLOOD PRESSURE: 84 MMHG | OXYGEN SATURATION: 100 % | HEIGHT: 68 IN | BODY MASS INDEX: 25.7 KG/M2 | SYSTOLIC BLOOD PRESSURE: 136 MMHG | WEIGHT: 169.6 LBS | HEART RATE: 63 BPM

## 2023-08-29 DIAGNOSIS — I10 HYPERTENSION GOAL BP (BLOOD PRESSURE) < 140/90: Primary | ICD-10-CM

## 2023-08-29 DIAGNOSIS — E78.5 HYPERLIPIDEMIA, UNSPECIFIED HYPERLIPIDEMIA TYPE: ICD-10-CM

## 2023-08-29 DIAGNOSIS — K64.4 EXTERNAL HEMORRHOIDS: ICD-10-CM

## 2023-08-29 LAB
ANION GAP SERPL CALCULATED.3IONS-SCNC: 10 MMOL/L (ref 7–15)
BUN SERPL-MCNC: 23.3 MG/DL (ref 8–23)
CALCIUM SERPL-MCNC: 9.3 MG/DL (ref 8.8–10.2)
CHLORIDE SERPL-SCNC: 106 MMOL/L (ref 98–107)
CHOLEST SERPL-MCNC: 180 MG/DL
CREAT SERPL-MCNC: 0.86 MG/DL (ref 0.67–1.17)
DEPRECATED HCO3 PLAS-SCNC: 25 MMOL/L (ref 22–29)
GFR SERPL CREATININE-BSD FRML MDRD: >90 ML/MIN/1.73M2
GLUCOSE SERPL-MCNC: 104 MG/DL (ref 70–99)
HDLC SERPL-MCNC: 53 MG/DL
LDLC SERPL CALC-MCNC: 107 MG/DL
NONHDLC SERPL-MCNC: 127 MG/DL
POTASSIUM SERPL-SCNC: 4.8 MMOL/L (ref 3.4–5.3)
SODIUM SERPL-SCNC: 141 MMOL/L (ref 136–145)
TRIGL SERPL-MCNC: 101 MG/DL

## 2023-08-29 PROCEDURE — 80048 BASIC METABOLIC PNL TOTAL CA: CPT | Performed by: FAMILY MEDICINE

## 2023-08-29 PROCEDURE — 99214 OFFICE O/P EST MOD 30 MIN: CPT | Mod: 25 | Performed by: FAMILY MEDICINE

## 2023-08-29 PROCEDURE — 36415 COLL VENOUS BLD VENIPUNCTURE: CPT | Performed by: FAMILY MEDICINE

## 2023-08-29 PROCEDURE — 80061 LIPID PANEL: CPT | Performed by: FAMILY MEDICINE

## 2023-08-29 PROCEDURE — G0009 ADMIN PNEUMOCOCCAL VACCINE: HCPCS | Performed by: FAMILY MEDICINE

## 2023-08-29 PROCEDURE — 90677 PCV20 VACCINE IM: CPT | Performed by: FAMILY MEDICINE

## 2023-08-29 RX ORDER — ATENOLOL 50 MG/1
50 TABLET ORAL DAILY
Qty: 90 TABLET | Refills: 3 | Status: SHIPPED | OUTPATIENT
Start: 2023-08-29 | End: 2024-06-05

## 2023-08-29 RX ORDER — B-COMPLEX WITH VITAMIN C
250 TABLET ORAL DAILY
COMMUNITY

## 2023-08-29 RX ORDER — HYDROCORTISONE 25 MG/G
CREAM TOPICAL 2 TIMES DAILY PRN
Qty: 28 G | Refills: 1 | Status: SHIPPED | OUTPATIENT
Start: 2023-08-29 | End: 2024-08-07

## 2023-08-29 RX ORDER — ERGOCALCIFEROL (VITAMIN D2) 10 MCG
TABLET ORAL
COMMUNITY

## 2023-08-29 ASSESSMENT — PAIN SCALES - GENERAL: PAINLEVEL: NO PAIN (0)

## 2023-08-29 NOTE — PROGRESS NOTES
"  Assessment & Plan     Hypertension goal BP (blood pressure) < 140/90  Controlled   Labs pending   - Lipid panel reflex to direct LDL Fasting; Future  - Basic metabolic panel  (Ca, Cl, CO2, Creat, Gluc, K, Na, BUN); Future  - Lipid panel reflex to direct LDL Fasting  - Basic metabolic panel  (Ca, Cl, CO2, Creat, Gluc, K, Na, BUN)    External hemorrhoids  Advised Inc Fiber in diet  Can use Miralax or senna  Follow up when he has hemorrhoid  - hydrocortisone, Perianal, (HYDROCORTISONE) 2.5 % cream; Place rectally 2 times daily as needed for hemorrhoids    Hyperlipidemia, unspecified hyperlipidemia type  Pt not taking his statin  Will await labs BMI:   Estimated body mass index is 26.06 kg/m  as calculated from the following:    Height as of this encounter: 1.718 m (5' 7.64\").    Weight as of this encounter: 76.9 kg (169 lb 9.6 oz).           Darlene Castaneda MD  [facility] [place] Robb is a 73 year old, presenting for the following health issues:  Hypertension      8/29/2023    11:03 AM   Additional Questions   Roomed by Jeannette HENDRICKS     Hypertension Follow-up    Do you check your blood pressure regularly outside of the clinic? Yes   Are you following a low salt diet? No added salt  Are your blood pressures ever more than 140 on the top number (systolic) OR more   than 90 on the bottom number (diastolic), for example 140/90? No  How many servings of fruits and vegetables do you eat daily?  2-3  On average, how many sweetened beverages do you drink each day (Examples: soda, juice, sweet tea, etc.  Do NOT count diet or artificially sweetened beverages)?   0  How many days per week do you exercise enough to make your heart beat faster? 4  How many minutes a day do you exercise enough to make your heart beat faster? 30 - 60  How many days per week do you miss taking your medication? 0  Hyperlipidemia Follow-Up    Are you regularly taking any medication or supplement to lower your " "cholesterol?   No  Are you having muscle aches or other side effects that you think could be caused by your cholesterol lowering medication?  No  Patient also complains of hemorrhoids off-and-on.  He does not have a hemorrhoid right now.  He states that when he has a hard bowel movement he will get a hemorrhoid which she pushes back.  No pain right now.    Review of Systems   CONSTITUTIONAL: NEGATIVE for fever, chills, change in weight  ENT/MOUTH: NEGATIVE for ear, mouth and throat problems  RESP: NEGATIVE for significant cough or SOB  CV: NEGATIVE for chest pain, palpitations or peripheral edema  GI: NEGATIVE for nausea, abdominal pain, heartburn, or change in bowel habits  PSYCHIATRIC: NEGATIVE for changes in mood or affect      Objective    /84   Pulse 63   Temp 97.5  F (36.4  C) (Temporal)   Ht 1.718 m (5' 7.64\")   Wt 76.9 kg (169 lb 9.6 oz)   SpO2 100%   BMI 26.06 kg/m    Body mass index is 26.06 kg/m .  Physical Exam   GENERAL: healthy, alert and no distress  NECK: no adenopathy, no asymmetry, masses, or scars and thyroid normal to palpation  RESP: lungs clear to auscultation - no rales, rhonchi or wheezes  CV: regular rate and rhythm, normal S1 S2, no S3 or S4, no murmur, click or rub, no peripheral edema and peripheral pulses strong  ABDOMEN: soft, nontender, no hepatosplenomegaly, no masses and bowel sounds normal  MS: no gross musculoskeletal defects noted, no edema  PSYCH: mentation appears normal    Pending                 "

## 2024-01-16 ENCOUNTER — TRANSFERRED RECORDS (OUTPATIENT)
Dept: HEALTH INFORMATION MANAGEMENT | Facility: CLINIC | Age: 74
End: 2024-01-16
Payer: COMMERCIAL

## 2024-01-16 LAB
ALT SERPL-CCNC: 14 IU/L (ref 0–44)
AST SERPL-CCNC: 25 IU/L (ref 0–40)
CREATININE (EXTERNAL): 0.84 MG/DL (ref 0.76–1.27)
GFR ESTIMATED (EXTERNAL): 92 ML/MIN/1.73
GLUCOSE (EXTERNAL): 81 MG/DL (ref 70–99)
POTASSIUM (EXTERNAL): 4.6 MMOL/L (ref 3.5–5.2)
TSH SERPL-ACNC: 3.74 UIU/ML (ref 0.45–4.5)

## 2024-01-24 ENCOUNTER — TRANSFERRED RECORDS (OUTPATIENT)
Dept: HEALTH INFORMATION MANAGEMENT | Facility: CLINIC | Age: 74
End: 2024-01-24
Payer: COMMERCIAL

## 2024-02-05 ENCOUNTER — TRANSFERRED RECORDS (OUTPATIENT)
Dept: HEALTH INFORMATION MANAGEMENT | Facility: CLINIC | Age: 74
End: 2024-02-05
Payer: COMMERCIAL

## 2024-02-08 ENCOUNTER — OFFICE VISIT (OUTPATIENT)
Dept: FAMILY MEDICINE | Facility: CLINIC | Age: 74
End: 2024-02-08
Payer: COMMERCIAL

## 2024-02-08 VITALS
RESPIRATION RATE: 16 BRPM | HEART RATE: 55 BPM | DIASTOLIC BLOOD PRESSURE: 77 MMHG | BODY MASS INDEX: 27.74 KG/M2 | WEIGHT: 183 LBS | TEMPERATURE: 97.5 F | SYSTOLIC BLOOD PRESSURE: 123 MMHG | HEIGHT: 68 IN | OXYGEN SATURATION: 100 %

## 2024-02-08 DIAGNOSIS — N40.0 PROSTATE ENLARGEMENT: ICD-10-CM

## 2024-02-08 DIAGNOSIS — R93.41 ABNORMAL RADIOLOGIC FINDINGS ON DIAGNOSTIC IMAGING OF RENAL PELVIS, URETER, OR BLADDER: Primary | ICD-10-CM

## 2024-02-08 DIAGNOSIS — D64.9 ANEMIA, UNSPECIFIED TYPE: ICD-10-CM

## 2024-02-08 DIAGNOSIS — D50.9 IRON DEFICIENCY ANEMIA, UNSPECIFIED IRON DEFICIENCY ANEMIA TYPE: ICD-10-CM

## 2024-02-08 DIAGNOSIS — Z12.5 SCREENING FOR PROSTATE CANCER: ICD-10-CM

## 2024-02-08 LAB
ALBUMIN UR-MCNC: NEGATIVE MG/DL
APPEARANCE UR: CLEAR
BILIRUB UR QL STRIP: NEGATIVE
COLOR UR AUTO: YELLOW
GLUCOSE UR STRIP-MCNC: NEGATIVE MG/DL
HGB UR QL STRIP: NEGATIVE
KETONES UR STRIP-MCNC: NEGATIVE MG/DL
LEUKOCYTE ESTERASE UR QL STRIP: NEGATIVE
NITRATE UR QL: NEGATIVE
PH UR STRIP: 6 [PH] (ref 5–8)
RETICS # AUTO: 0.04 10E6/UL (ref 0.01–0.11)
RETICS/RBC NFR AUTO: 1.1 % (ref 0.8–2.7)
SP GR UR STRIP: 1.02 (ref 1–1.03)
UROBILINOGEN UR STRIP-ACNC: 0.2 E.U./DL

## 2024-02-08 PROCEDURE — 85045 AUTOMATED RETICULOCYTE COUNT: CPT | Performed by: FAMILY MEDICINE

## 2024-02-08 PROCEDURE — 83550 IRON BINDING TEST: CPT | Performed by: FAMILY MEDICINE

## 2024-02-08 PROCEDURE — 88112 CYTOPATH CELL ENHANCE TECH: CPT | Performed by: PATHOLOGY

## 2024-02-08 PROCEDURE — 83615 LACTATE (LD) (LDH) ENZYME: CPT | Performed by: FAMILY MEDICINE

## 2024-02-08 PROCEDURE — G0103 PSA SCREENING: HCPCS | Performed by: FAMILY MEDICINE

## 2024-02-08 PROCEDURE — 81003 URINALYSIS AUTO W/O SCOPE: CPT | Performed by: FAMILY MEDICINE

## 2024-02-08 PROCEDURE — 83010 ASSAY OF HAPTOGLOBIN QUANT: CPT | Performed by: FAMILY MEDICINE

## 2024-02-08 PROCEDURE — 82728 ASSAY OF FERRITIN: CPT | Performed by: FAMILY MEDICINE

## 2024-02-08 PROCEDURE — 99214 OFFICE O/P EST MOD 30 MIN: CPT | Performed by: FAMILY MEDICINE

## 2024-02-08 PROCEDURE — 36415 COLL VENOUS BLD VENIPUNCTURE: CPT | Performed by: FAMILY MEDICINE

## 2024-02-08 PROCEDURE — 88120 CYTP URNE 3-5 PROBES EA SPEC: CPT | Performed by: PATHOLOGY

## 2024-02-08 PROCEDURE — 83540 ASSAY OF IRON: CPT | Performed by: FAMILY MEDICINE

## 2024-02-08 RX ORDER — TRIAMCINOLONE ACETONIDE 5 MG/G
CREAM TOPICAL
COMMUNITY
Start: 2023-11-14

## 2024-02-08 NOTE — PROGRESS NOTES
Assessment & Plan     1. Abnormal radiologic findings on diagnostic imaging of renal pelvis, ureter, or bladder  - UA Macroscopic with reflex to Microscopic and Culture - Lab Collect  - Cytology non gyn    Abnormal bladder wall thickening on CT scan with Nicole.  Told to follow-up with primary care.  UA negative for infection.  Denies any hematuria.  We will proceed with urine cytology to evaluate for abnormal cells.  Consider referral to urology for further evaluation, possible cystoscopy.    2. Anemia, unspecified type  - Ferritin  - Lactate Dehydrogenase  - Reticulocyte count  - Haptoglobin  - Iron and iron binding capacity    Chronic microcytic anemia, has completed upper and lower endoscopies which were recently normal.  Not on any anticoagulants or blood thinners.  No aspirin.  Reports he has a PillCam study scheduled.  Has never been on iron supplementation.  No other red flag symptoms present.  It does report bleeding with his hemorrhoids though they do not want to fix his hemorrhoids until his anemia is worked up.    I suspect chronic blood loss anemia, likely from his hemorrhoids.  Will check ferritin level for suspected iron deficiency.  Will plan for iron replacement which I will prescribe with follow-up labs in 3 months once we confirm iron deficiency.  We will also obtain additional workup to look for destruction of red blood cells.  Platelets and white count fine per outside records.    He is symptomatic from his anemia, reports worsening fatigue and less exercise tolerance.    3. Prostate enlargement  4. Screening for prostate cancer  - PSA, screen    Prostate incidentally enlarged on imaging.  Denies any symptoms of BPH.  Due for PSA screening today.      34 minutes spent on date of encounter with chart review, patient visit, counseling, and documentation.    Peter Robb is a 73 year old, presenting for the following health issues:  UTI and Anemia      2/8/2024    10:18 AM   Additional  Questions   Roomed by Amanda CHAUDHRY CMA   Accompanied by Self     UTI    Anemia    History of Present Illness       Reason for visit:  Ck for blader infection after CT scan  Symptom intensity:  Mild  Symptom progression:  Staying the same  Had these symptoms before:  No    He eats 2-3 servings of fruits and vegetables daily.He consumes 1 sweetened beverage(s) daily.He exercises with enough effort to increase his heart rate 20 to 29 minutes per day.  He exercises with enough effort to increase his heart rate 4 days per week.   He is taking medications regularly.     Abnormal radiologic findings on diagnostic imaging of renal pelvis, ureter, or bladder    Recent Results (from the past 24 hour(s))   UA Macroscopic with reflex to Microscopic and Culture - Lab Collect    Collection Time: 02/08/24 10:33 AM    Specimen: Urine, Clean Catch   Result Value Ref Range    Color Urine Yellow Colorless, Straw, Light Yellow, Yellow    Appearance Urine Clear Clear    Glucose Urine Negative Negative mg/dL    Bilirubin Urine Negative Negative    Ketones Urine Negative Negative mg/dL    Specific Gravity Urine 1.025 1.005 - 1.030    Blood Urine Negative Negative    pH Urine 6.0 5.0 - 8.0    Protein Albumin Urine Negative Negative mg/dL    Urobilinogen Urine 0.2 0.2, 1.0 E.U./dL    Nitrite Urine Negative Negative    Leukocyte Esterase Urine Negative Negative      Notes urinary frequency but increasing water intake.   Has never seen urologist.       Prostate enlargement  PSA   Date Value Ref Range Status   02/11/2020 3.98 0 - 4 ug/L Final     Comment:     Assay Method:  Chemiluminescence using Siemens Vista analyzer     Prostate Specific Antigen Screen   Date Value Ref Range Status   05/11/2022 1.65 0.00 - 4.00 ug/L Final     Does at times feel incomplete emptying of the bladder.   Notices some frequency to go. Urgency improved with avoiding caffeine.       Anemia, unspecified type  Hemoglobin   Date Value Ref Range Status   02/11/2020 11.3  "(L) 13.3 - 17.7 g/dL Final     Told anemic when he was going to have the colonoscopy.   Was feeling more bloated and constipation, [facility] recommended colonoscopy.   Colonoscopy was completed a few weeks ago, normal.   Dealing with hemorrhoids, prep for colonoscopy aggravating hemorrhoids.    Hx reflux, not so much recently.   No aspirin or NSAIDS. Upper endoscopy also completed and normal.   Has pill cam scheduled as well.     ROS: See HPI above.       Objective    /77 (BP Location: Left arm, Patient Position: Sitting, Cuff Size: Adult Regular)   Pulse 55   Temp 97.5  F (36.4  C) (Oral)   Resp 16   Ht 1.718 m (5' 7.64\")   Wt 83 kg (183 lb)   SpO2 100%   BMI 28.12 kg/m    Body mass index is 28.12 kg/m .  Physical Exam   GENERAL: alert and no distress  NECK: no adenopathy, no asymmetry, masses, or scars  RESP: lungs clear to auscultation - no rales, rhonchi or wheezes  CV: regular rate and rhythm, no murmurs   ABDOMEN: soft, nontender, no hepatosplenomegaly, no masses and bowel sounds normal  MS: no gross musculoskeletal defects noted, no edema            Signed Electronically by: Alea Estes,     "

## 2024-02-09 LAB
FERRITIN SERPL-MCNC: 8 NG/ML (ref 31–409)
HAPTOGLOB SERPL-MCNC: 162 MG/DL (ref 30–200)
IRON BINDING CAPACITY (ROCHE): 506 UG/DL (ref 240–430)
IRON SATN MFR SERPL: 3 % (ref 15–46)
IRON SERPL-MCNC: 13 UG/DL (ref 61–157)
LDH SERPL L TO P-CCNC: 197 U/L (ref 0–250)
PATH REPORT.COMMENTS IMP SPEC: NORMAL
PATH REPORT.FINAL DX SPEC: NORMAL
PATH REPORT.GROSS SPEC: NORMAL
PATH REPORT.MICROSCOPIC SPEC OTHER STN: NORMAL
PATH REPORT.RELEVANT HX SPEC: NORMAL
PSA SERPL DL<=0.01 NG/ML-MCNC: 1.26 NG/ML (ref 0–6.5)

## 2024-02-09 RX ORDER — FERROUS SULFATE 325(65) MG
325 TABLET ORAL
Qty: 90 TABLET | Refills: 0 | Status: SHIPPED | OUTPATIENT
Start: 2024-02-09 | End: 2024-05-03

## 2024-02-09 NOTE — RESULT ENCOUNTER NOTE
Patient updated by Wowboard message with lab results.      Deangelo Robb,  Your lab results have returned and do show iron deficiency.  Remaining workup normal.  I am still waiting on the follow-up urine test and we will reach back out when those results return.  To treat iron deficiency anemia, lets start an iron supplement and plan to recheck labs in 3 months.  I will place future orders.  Schedule lab only appointment at your convenience.  Alea Estes, DO

## 2024-02-12 NOTE — RESULT ENCOUNTER NOTE
Patient updated by [employer] message with lab results.       Deangelo Robb,  We have completed urine studies to work up bladder wall thickening. To date, you have a normal urinalysis (no sign of infection) and a normal cytology (no sign of cancerous cells). These tests are reassuring. Bladder wall thickening can be a non-specific finding. With normal labs and no concerning symptoms, reasonable to hold off on additional work up at this time. If you would like a more comprehensive evaluation, I can place a referral to urology to consider a cystoscopy to look at the inside of the bladder with a scope. Let me know if you would like the referral to ensure a comprehensive evaluation of the abnormal finding on CT scan.  Alea Estes, DO

## 2024-02-17 ENCOUNTER — TRANSFERRED RECORDS (OUTPATIENT)
Dept: HEALTH INFORMATION MANAGEMENT | Facility: CLINIC | Age: 74
End: 2024-02-17
Payer: COMMERCIAL

## 2024-02-20 LAB
PATH REPORT.COMMENTS IMP SPEC: NORMAL
PATH REPORT.FINAL DX SPEC: NORMAL
PATH REPORT.GROSS SPEC: NORMAL
PATH REPORT.MICROSCOPIC SPEC OTHER STN: NORMAL
PATH REPORT.RELEVANT HX SPEC: NORMAL

## 2024-03-12 ENCOUNTER — TRANSFERRED RECORDS (OUTPATIENT)
Dept: HEALTH INFORMATION MANAGEMENT | Facility: CLINIC | Age: 74
End: 2024-03-12
Payer: COMMERCIAL

## 2024-04-30 ENCOUNTER — DOCUMENTATION ONLY (OUTPATIENT)
Dept: FAMILY MEDICINE | Facility: CLINIC | Age: 74
End: 2024-04-30
Payer: COMMERCIAL

## 2024-04-30 DIAGNOSIS — Z00.00 PREVENTATIVE HEALTH CARE: Primary | ICD-10-CM

## 2024-04-30 NOTE — PROGRESS NOTES
Pt is coming in for a lab visit. Pt is also requesting for a Vit D blood draw. Please review chart and place orders.

## 2024-05-02 ENCOUNTER — LAB (OUTPATIENT)
Dept: LAB | Facility: CLINIC | Age: 74
End: 2024-05-02
Payer: COMMERCIAL

## 2024-05-02 DIAGNOSIS — E84.0 CYSTIC FIBROSIS OF THE LUNG (H): ICD-10-CM

## 2024-05-02 DIAGNOSIS — D50.9 IRON DEFICIENCY ANEMIA, UNSPECIFIED IRON DEFICIENCY ANEMIA TYPE: ICD-10-CM

## 2024-05-02 LAB
ERYTHROCYTE [DISTWIDTH] IN BLOOD BY AUTOMATED COUNT: 21.7 % (ref 10–15)
HCT VFR BLD AUTO: 40.1 % (ref 40–53)
HGB BLD-MCNC: 12.6 G/DL (ref 13.3–17.7)
MCH RBC QN AUTO: 26.9 PG (ref 26.5–33)
MCHC RBC AUTO-ENTMCNC: 31.4 G/DL (ref 31.5–36.5)
MCV RBC AUTO: 86 FL (ref 78–100)
PLATELET # BLD AUTO: 271 10E3/UL (ref 150–450)
RBC # BLD AUTO: 4.68 10E6/UL (ref 4.4–5.9)
WBC # BLD AUTO: 7.1 10E3/UL (ref 4–11)

## 2024-05-02 PROCEDURE — 36415 COLL VENOUS BLD VENIPUNCTURE: CPT

## 2024-05-02 PROCEDURE — 85027 COMPLETE CBC AUTOMATED: CPT

## 2024-05-02 PROCEDURE — 82306 VITAMIN D 25 HYDROXY: CPT

## 2024-05-02 PROCEDURE — 82728 ASSAY OF FERRITIN: CPT

## 2024-05-03 DIAGNOSIS — D50.9 IRON DEFICIENCY ANEMIA, UNSPECIFIED IRON DEFICIENCY ANEMIA TYPE: ICD-10-CM

## 2024-05-03 LAB
FERRITIN SERPL-MCNC: 57 NG/ML (ref 31–409)
VIT D+METAB SERPL-MCNC: 42 NG/ML (ref 20–50)

## 2024-05-03 RX ORDER — FERROUS SULFATE 325(65) MG
325 TABLET ORAL
Qty: 90 TABLET | Refills: 0 | Status: SHIPPED | OUTPATIENT
Start: 2024-05-03

## 2024-05-03 NOTE — PROGRESS NOTES
1. Iron deficiency anemia, unspecified iron deficiency anemia type  - ferrous sulfate (FEROSUL) 325 (65 Fe) MG tablet; Take 1 tablet (325 mg) by mouth daily (with breakfast)  Dispense: 90 tablet; Refill: 0     Ferritin normalizing, hemoglobin normalizing, MCV improved.  Continue iron supplementation.    Alea Estes, DO

## 2024-05-03 NOTE — RESULT ENCOUNTER NOTE
Patient updated by [employer] message with lab results.  Refill for ferrous sulfate sent to pharmacy.      Deangelo Robb,  Thank you for coming back to recheck labs.  Hemoglobin is improving nicely, ferritin level normalizing.  Please continue iron supplementation.  Alea Estes, DO

## 2024-05-28 ENCOUNTER — TELEPHONE (OUTPATIENT)
Dept: FAMILY MEDICINE | Facility: CLINIC | Age: 74
End: 2024-05-28
Payer: COMMERCIAL

## 2024-05-28 NOTE — TELEPHONE ENCOUNTER
FYI - Status Update    Who is Calling: patient    Update: patient is ok with being seen on 6/5/24 for his AW and current concerns, and ok with it being a 40 min appt. Central scheduling is not able to alter the time would have to be done by clinic    Does caller want a call/response back: No

## 2024-05-28 NOTE — TELEPHONE ENCOUNTER
----- Message from Alea Estes DO sent at 5/24/2024  5:17 PM CDT -----  Cezar has an appointment coming up, he has openings before and after him.  It looks like he is due for his wellness visit.  Please see if he would be willing to address his wellness visit plus his current concerns and offer to extend visit to 40 minutes (either extending or bumping appointment up 20 minutes).  Thank you.  Alea Estes DO   EGD/Colonoscopy

## 2024-05-28 NOTE — TELEPHONE ENCOUNTER
Called pt and LMTCB. When pt calls back, please confirm if pt is ok doing an AWV on 6/5/24. If he is ok doing an AWV, please change the visit type and make it 40 min.   None

## 2024-06-02 ENCOUNTER — NURSE TRIAGE (OUTPATIENT)
Dept: NURSING | Facility: CLINIC | Age: 74
End: 2024-06-02
Payer: COMMERCIAL

## 2024-06-02 NOTE — TELEPHONE ENCOUNTER
Nurse Triage SBAR    Is this a 2nd Level Triage? No    Situation/Background: Patient is calling with concern of a foreign body in his left eye upper lid. Patient was plumbing last evening, under the kitchen sink; felt something fall into his eye. Patient is in car with spouse and daughter. Patient is asking where he should be evaluated.    Assessment:   Pain in eye/upper lid if moving eye  Has swelling in left upper and lower eyelids   Has tried eyewash 3 times, total of 20 minutes    Recommendation: Per disposition, Go to ED now. Reviewed Care Advice with patient and verbalizes understanding. Declines additional questions. Advised patient to call back with any new or worsening symptoms. Patient verbalized understanding and agrees with plan.     Protocol Recommended Disposition: Emergency department    Maribeth Blue RN on 6/2/2024 at 10:02 AM  [facility]  Reason for Disposition   [1] Eye pain AND [2] persists > 1 hour since irrigation (regardless of duration of flushing)    Additional Information   Negative: Doesn't sound like foreign body (FB) in the eye   Negative: Foreign body is a piece of chemical   Negative: Foreign body (FB) stuck on eyeball  (Exception: Contact lens.)   Negative: [1] Sharp FB (even if FB was removed) AND [2] any pain present now   Negative: FB hit eye at high speed (e.g., small metallic chip from hammering, lawnmower, BB gun, explosion)   Negative: [1] Eye has been washed out > 30 minutes ago AND [2] feels like FB is still present    Protocols used: Eye - Foreign Body-A-

## 2024-06-05 ENCOUNTER — OFFICE VISIT (OUTPATIENT)
Dept: FAMILY MEDICINE | Facility: CLINIC | Age: 74
End: 2024-06-05
Payer: COMMERCIAL

## 2024-06-05 VITALS
HEIGHT: 68 IN | DIASTOLIC BLOOD PRESSURE: 84 MMHG | RESPIRATION RATE: 16 BRPM | TEMPERATURE: 98 F | BODY MASS INDEX: 26.37 KG/M2 | HEART RATE: 47 BPM | SYSTOLIC BLOOD PRESSURE: 141 MMHG | WEIGHT: 174 LBS | OXYGEN SATURATION: 99 %

## 2024-06-05 DIAGNOSIS — R53.83 FATIGUE, UNSPECIFIED TYPE: ICD-10-CM

## 2024-06-05 DIAGNOSIS — Z00.00 ENCOUNTER FOR MEDICARE ANNUAL WELLNESS EXAM: Primary | ICD-10-CM

## 2024-06-05 DIAGNOSIS — E78.5 HYPERLIPIDEMIA, UNSPECIFIED HYPERLIPIDEMIA TYPE: ICD-10-CM

## 2024-06-05 DIAGNOSIS — I10 HYPERTENSION GOAL BP (BLOOD PRESSURE) < 140/90: ICD-10-CM

## 2024-06-05 DIAGNOSIS — D50.9 IRON DEFICIENCY ANEMIA, UNSPECIFIED IRON DEFICIENCY ANEMIA TYPE: ICD-10-CM

## 2024-06-05 PROBLEM — Z86.0100 HISTORY OF COLONIC POLYPS: Status: ACTIVE | Noted: 2017-02-27

## 2024-06-05 PROCEDURE — 80061 LIPID PANEL: CPT | Performed by: FAMILY MEDICINE

## 2024-06-05 PROCEDURE — 99214 OFFICE O/P EST MOD 30 MIN: CPT | Mod: 25 | Performed by: FAMILY MEDICINE

## 2024-06-05 PROCEDURE — 84443 ASSAY THYROID STIM HORMONE: CPT | Performed by: FAMILY MEDICINE

## 2024-06-05 PROCEDURE — 36415 COLL VENOUS BLD VENIPUNCTURE: CPT | Performed by: FAMILY MEDICINE

## 2024-06-05 PROCEDURE — G0439 PPPS, SUBSEQ VISIT: HCPCS | Performed by: FAMILY MEDICINE

## 2024-06-05 PROCEDURE — 80053 COMPREHEN METABOLIC PANEL: CPT | Performed by: FAMILY MEDICINE

## 2024-06-05 RX ORDER — AMLODIPINE BESYLATE 2.5 MG/1
2.5 TABLET ORAL DAILY
Qty: 90 TABLET | Refills: 3 | Status: SHIPPED | OUTPATIENT
Start: 2024-06-05 | End: 2024-07-15 | Stop reason: SINTOL

## 2024-06-05 RX ORDER — KETOROLAC TROMETHAMINE 5 MG/ML
1 SOLUTION OPHTHALMIC
COMMUNITY
Start: 2024-06-02 | End: 2024-08-07

## 2024-06-05 RX ORDER — ATENOLOL 50 MG/1
25 TABLET ORAL DAILY
Status: SHIPPED
Start: 2024-06-05 | End: 2024-07-15 | Stop reason: SINTOL

## 2024-06-05 SDOH — HEALTH STABILITY: PHYSICAL HEALTH: ON AVERAGE, HOW MANY MINUTES DO YOU ENGAGE IN EXERCISE AT THIS LEVEL?: 40 MIN

## 2024-06-05 SDOH — HEALTH STABILITY: PHYSICAL HEALTH: ON AVERAGE, HOW MANY DAYS PER WEEK DO YOU ENGAGE IN MODERATE TO STRENUOUS EXERCISE (LIKE A BRISK WALK)?: 6 DAYS

## 2024-06-05 ASSESSMENT — SOCIAL DETERMINANTS OF HEALTH (SDOH): HOW OFTEN DO YOU GET TOGETHER WITH FRIENDS OR RELATIVES?: ONCE A WEEK

## 2024-06-05 ASSESSMENT — ENCOUNTER SYMPTOMS: EYE PAIN: 1

## 2024-06-05 NOTE — PATIENT INSTRUCTIONS
"Preventive Care Advice   This is general advice we often give to help people stay healthy. Your care team may have specific advice just for you. Please talk to your care team about your own preventive care needs.  Lifestyle  Exercise at least 150 minutes each week (30 minutes a day, 5 days a week).  Do muscle strengthening activities 2 days a week. These help control your weight and prevent disease.  No smoking.  Wear sunscreen to prevent skin cancer.  Have your home tested for radon every 2 to 5 years. Radon is a colorless, odorless gas that can harm your lungs. To learn more, go to www.health.[facility].mn. and search for \"Radon in Homes.\"  Keep guns unloaded and locked up in a safe place like a safe or gun vault, or, use a gun lock and hide the keys. Always lock away bullets separately. To learn more, visit [employer].[place].gov and search for \"safe gun storage.\"  Nutrition  Eat 5 or more servings of fruits and vegetables each day.  Try wheat bread, brown rice and whole grain pasta (instead of white bread, rice, and pasta).  Get enough calcium and vitamin D. Check the label on foods and aim for 100% of the RDA (recommended daily allowance).  Regular exams  Have a dental exam and cleaning every 6 months.  See your health care team every year to talk about:  Any changes in your health.  Any medicines your care team has prescribed.  Preventive care, family planning, and ways to prevent chronic diseases.  Shots (vaccines)   HPV shots (up to age 26), if you've never had them before.  Hepatitis B shots (up to age 59), if you've never had them before.  COVID-19 shot: Get this shot when it's due.  Flu shot: Get a flu shot every year.  Tetanus shot: Get a tetanus shot every 10 years.  Pneumococcal, hepatitis A, and RSV shots: Ask your care team if you need these based on your risk.  Shingles shot (for age 50 and up).  General health tests  Diabetes screening:  Starting at age 35, Get screened for diabetes at least every 3 years.  If " you are younger than age 35, ask your care team if you should be screened for diabetes.  Cholesterol test: At age 39, start having a cholesterol test every 5 years, or more often if advised.  Bone density scan (DEXA): At age 50, ask your care team if you should have this scan for osteoporosis (brittle bones).  Hepatitis C: Get tested at least once in your life.  Abdominal aortic aneurysm screening: Talk to your doctor about having this screening if you:  Have ever smoked; and  Are biologically male; and  Are between the ages of 65 and 75.  STIs (sexually transmitted infections)  Before age 24: Ask your care team if you should be screened for STIs.  After age 24: Get screened for STIs if you're at risk. You are at risk for STIs (including HIV) if:  You are sexually active with more than one person.  You don't use condoms every time.  You or a partner was diagnosed with a sexually transmitted infection.  If you are at risk for HIV, ask about PrEP medicine to prevent HIV.  Get tested for HIV at least once in your life, whether you are at risk for HIV or not.  Cancer screening tests  Cervical cancer screening: If you have a cervix, begin getting regular cervical cancer screening tests at age 21. Most people who have regular screenings with normal results can stop after age 65. Talk about this with your provider.  Breast cancer scan (mammogram): If you've ever had breasts, begin having regular mammograms starting at age 40. This is a scan to check for breast cancer.  Colon cancer screening: It is important to start screening for colon cancer at age 45.  Have a colonoscopy test every 10 years (or more often if you're at risk) Or, ask your provider about stool tests like a FIT test every year or Cologuard test every 3 years.  To learn more about your testing options, visit: www.[employer]/619501.pdf.  For help making a decision, visit: luci/[email or web address].  Prostate cancer screening test: If you have a prostate and are age 55  to 69, ask your provider if you would benefit from a yearly prostate cancer screening test.  Lung cancer screening: If you are a current or former smoker age 50 to 80, ask your care team if ongoing lung cancer screenings are right for you.  For informational purposes only. Not to replace the advice of your health care provider. Copyright   2023 [employer]. All rights reserved. Clinically reviewed by the [facility]. Head Held High 279334 - REV 04/24.    Learning About Sleeping Well  What does sleeping well mean?     Sleeping well means getting enough sleep to feel good and stay healthy. How much sleep is enough varies among people.  The number of hours you sleep and how you feel when you wake up are both important. If you do not feel refreshed, you probably need more sleep. Another sign of not getting enough sleep is feeling tired during the day.  Experts recommend that adults get at least 7 or more hours of sleep per day. Children and older adults need more sleep.  Why is getting enough sleep important?  Getting enough quality sleep is a basic part of good health. When your sleep suffers, your physical health, mood, and your thoughts can suffer too. You may find yourself feeling more grumpy or stressed. Not getting enough sleep also can lead to serious problems, including injury, accidents, anxiety, and depression.  What might cause poor sleeping?  Many things can cause sleep problems, including:  Changes to your sleep schedule.  Stress. Stress can be caused by fear about a single event, such as giving a speech. Or you may have ongoing stress, such as worry about work or school.  Depression, anxiety, and other mental or emotional conditions.  Changes in your sleep habits or surroundings. This includes changes that happen where you sleep, such as noise, light, or sleeping in a different bed. It also includes changes in your sleep pattern, such as having jet lag or working a late  "shift.  Health problems, such as pain, breathing problems, and restless legs syndrome.  Lack of regular exercise.  Using alcohol, nicotine, or caffeine before bed.  How can you help yourself?  Here are some tips that may help you sleep more soundly and wake up feeling more refreshed.  Your sleeping area   Use your bedroom only for sleeping and sex. A bit of light reading may help you fall asleep. But if it doesn't, do your reading elsewhere in the house. Try not to use your TV, computer, smartphone, or tablet while you are in bed.  Be sure your bed is big enough to stretch out comfortably, especially if you have a sleep partner.  Keep your bedroom quiet, dark, and cool. Use curtains, blinds, or a sleep mask to block out light. To block out noise, use earplugs, soothing music, or a \"white noise\" machine.  Your evening and bedtime routine   Create a relaxing bedtime routine. You might want to take a warm shower or bath, or listen to soothing music.  Go to bed at the same time every night. And get up at the same time every morning, even if you feel tired.  What to avoid   Limit caffeine (coffee, tea, caffeinated sodas) during the day, and don't have any for at least 6 hours before bedtime.  Avoid drinking alcohol before bedtime. Alcohol can cause you to wake up more often during the night.  Try not to smoke or use tobacco, especially in the evening. Nicotine can keep you awake.  Limit naps during the day, especially close to bedtime.  Avoid lying in bed awake for too long. If you can't fall asleep or if you wake up in the middle of the night and can't get back to sleep within about 20 minutes, get out of bed and go to another room until you feel sleepy.  Avoid taking medicine right before bed that may keep you awake or make you feel hyper or energized. Your doctor can tell you if your medicine may do this and if you can take it earlier in the day.  If you can't sleep   Imagine yourself in a peaceful, pleasant scene. " "Focus on the details and feelings of being in a place that is relaxing.  Get up and do a quiet or boring activity until you feel sleepy.  Avoid drinking any liquids before going to bed to help prevent waking up often to use the bathroom.  Where can you learn more?  Go to https://www.[employer].net/patiented  Enter J942 in the search box to learn more about \"Learning About Sleeping Well.\"  Current as of: July 10, 2023               Content Version: 14.0    1115-9028 [employer].   Care instructions adapted under license by your healthcare professional. If you have questions about a medical condition or this instruction, always ask your healthcare professional. [employer] disclaims any warranty or liability for your use of this information.      "

## 2024-06-05 NOTE — PROGRESS NOTES
Preventive Care Visit  [facility]  Alea Estes DO, Family Medicine  Jun 5, 2024      Assessment & Plan     1. Encounter for Medicare annual wellness exam  Reviewed health history and health maintenance recommendations.    2. Iron deficiency anemia, unspecified iron deficiency anemia type  - CBC with platelets; Future  - Ferritin; Future    Labs have normalized, continues iron supplementation until early August.  We will plan for lab only appointment to recheck CBC and ferritin in August.  At that time, if still no concerns for persistent or recurrent rectal bleeding, would trial off iron supplementation until next set of labs.  Workup for iron deficiency anemia was otherwise reassuring.  He did not complete the PillCam.  Presumably hemorrhoids are the etiology and hemorrhoids have improved.    Symptomatically, he feels so much better.  Color looks better.  No longer short of breath with activities.    3. Hyperlipidemia, unspecified hyperlipidemia type  - Lipid panel reflex to direct LDL Fasting; Future  - Comprehensive metabolic panel (BMP + Alb, Alk Phos, ALT, AST, Total. Bili, TP); Future    Reviewed his ASCVD risk profile.  Will optimize blood pressure control.  He is statin hesitant at this time, will reevaluate with updated cholesterol labs.  Could consider coronary artery calcium score for further restratification.     4. Hypertension goal BP (blood pressure) < 140/90  - Comprehensive metabolic panel (BMP + Alb, Alk Phos, ALT, AST, Total. Bili, TP); Future  - atenolol (TENORMIN) 50 MG tablet; Take 0.5 tablets (25 mg) by mouth daily Pharmacy to inform patient that he is due to see his Provider  - amLODIPine (NORVASC) 2.5 MG tablet; Take 1 tablet (2.5 mg) by mouth daily  Dispense: 90 tablet; Refill: 3    Continues on atenolol, heart rate is low today at 47.  Blood pressure is not at goal.  He had swelling with lisinopril so will not retry an ACE inhibitor.  He had hypokalemia  "with thiazide diuretic.    Recommend we add in amlodipine 2.5 mg daily and decrease atenolol from 50 mg daily to 25 mg daily (can cut tablets in half).  We will obtain update with home blood pressure readings in 2 weeks.  Depending upon his tolerance of medication, heart rate, and blood pressure, we could discontinue atenolol and continue to monitor or we could discontinue atenolol and increase dose of amlodipine to cross-taper fully over to calcium channel blocker instead of beta-blocker.  If he is doing well on the combination, we could continue as well.  Await response.    5. Fatigue, unspecified type  - TSH with free T4 reflex; Future      Is intermittently feeling more fatigued and he is experiencing more bags under his eyes.  Will check thyroid to rule out hypothyroidism.    Regarding bakes under eyes, I suspect this is more reflective of age.  Can consider low-salt diet.  Can trial over-the-counter topical options.  Could trial treatment for allergies with antihistamine.  If symptoms not improving, we could discuss with eye doctor further cosmetic treatment options.    Patient has been advised of split billing requirements and indicates understanding: Yes      BMI  Estimated body mass index is 26.46 kg/m  as calculated from the following:    Height as of this encounter: 1.727 m (5' 8\").    Weight as of this encounter: 78.9 kg (174 lb).       Counseling  Appropriate preventive services were discussed with this patient, including applicable screening as appropriate for fall prevention, nutrition, physical activity, Tobacco-use cessation, weight loss and cognition.  Checklist reviewing preventive services available has been given to the patient.  Reviewed patient's diet, addressing concerns and/or questions.   Discussed possible causes of fatigue.       Peter Robb is a 73 year old, presenting for the following:  Wellness Visit (Fasting for labs.) and Eye Problem        6/5/2024     9:27 AM   Additional " Questions   Roomed by Amanda CHAUDHRY CMA   Accompanied by Self         6/5/2024   Forms   Any forms needing to be completed Yes       Health Care Directive  Patient does not have a Health Care Directive or Living Will: Discussed advance care planning with patient; however, patient declined at this time.    Eye Problem       Encounter for Medicare annual wellness exam  - RSV: defer, consider in fall    - Covid: declines, unsure if covered by insurance       Iron deficiency anemia, unspecified iron deficiency anemia type  Lab Results   Component Value Date    HGB 12.6 05/02/2024    HGB 11.3 02/11/2020     Lab Results   Component Value Date    MCV 86 05/02/2024    MCV 80 02/11/2020     Ferritin   Date Value Ref Range Status   05/02/2024 57 31 - 409 ng/mL Final     Iron   Date Value Ref Range Status   02/08/2024 13 (L) 61 - 157 ug/dL Final     Iron Binding Capacity   Date Value Ref Range Status   02/08/2024 506 (H) 240 - 430 ug/dL Final     Continues on iron supplementation.  Didn't complete the pill cam.   Hemorrhoids: No more rectal bleeding, hemorrhoids have receded, still present but improved.        Hyperlipidemia, unspecified hyperlipidemia type  The 10-year ASCVD risk score (Shady DONAHUE, et al., 2019) is: 27.6%    Values used to calculate the score:      Age: 73 years      Sex: Male      Is Non- : No      Diabetic: No      Tobacco smoker: No      Systolic Blood Pressure: 141 mmHg      Is BP treated: Yes      HDL Cholesterol: 53 mg/dL      Total Cholesterol: 180 mg/dL     Lab Results   Component Value Date    CHOL 180 08/29/2023    CHOL 226 02/16/2021     Lab Results   Component Value Date    HDL 53 08/29/2023    HDL 41 02/16/2021     Lab Results   Component Value Date     08/29/2023     02/16/2021     Lab Results   Component Value Date    TRIG 101 08/29/2023    TRIG 319 02/16/2021     Previously prescribed pravastatin but hasn't taken.  Prefers to work on healthy lifestyle habits.        Hypertension goal BP (blood pressure) < 140/90  Currently on atenolol 50mg daily.  Hasn't been checking regularly, though has been better than today.  Was on hydrochlorothiazide, experienced low potassium.   Is experiencing some cold hands and feet with atenolol. Wondering about changing to alternative.     BP Readings from Last 6 Encounters:   06/05/24 (!) 147/89   02/08/24 123/77   08/29/23 136/84   05/11/22 138/86   02/16/21 110/60   02/11/20 132/80     Last Comprehensive Metabolic Panel:  Lab Results   Component Value Date     08/29/2023    POTASSIUM 4.8 08/29/2023    CHLORIDE 106 08/29/2023    CO2 25 08/29/2023    ANIONGAP 10 08/29/2023     (H) 08/29/2023    BUN 23.3 (H) 08/29/2023    CR 0.86 08/29/2023    GFRESTIMATED >90 08/29/2023    EMETERIO 9.3 08/29/2023         Corneal abrasion   Seen in the emergency department on 6/2.  Symptoms feeling better.  Has eye appointment scheduled on Monday. Was plumbing udner the sink, some stuff fell into his face, debris in eye. Tried eye wash, was able to get it out. Had abrasion on his eye. Using an ointment to help heal it up. Has appointment for eye doctor to look next week, though feels like it has normalized.       Eye bags  Noticing accumulation under eyes         6/5/2024   General Health   How would you rate your overall physical health? Good   Feel stress (tense, anxious, or unable to sleep) Not at all         6/5/2024   Nutrition   Diet: Other   If other, please elaborate: high fiber and food with more iron         6/5/2024   Exercise   Days per week of moderate/strenous exercise 6 days   Average minutes spent exercising at this level 40 min         6/5/2024   Social Factors   Frequency of gathering with friends or relatives Once a week   Worry food won't last until get money to buy more No   Food not last or not have enough money for food? No   Do you have housing?  Yes   Are you worried about losing your housing? No   Lack of transportation? No    Unable to get utilities (heat,electricity)? No         6/5/2024   Fall Risk   Fallen 2 or more times in the past year? No   Trouble with walking or balance? No          6/5/2024   Activities of Daily Living- Home Safety   Needs help with the following daily activites None of the above   Safety concerns in the home None of the above         6/5/2024   Dental   Dentist two times every year? Yes         6/5/2024   Hearing Screening   Hearing concerns? None of the above         6/5/2024   Driving Risk Screening   Patient/family members have concerns about driving No         6/5/2024   General Alertness/Fatigue Screening   Have you been more tired than usual lately? (!) YES         6/5/2024   Urinary Incontinence Screening   Bothered by leaking urine in past 6 months No         6/5/2024   TB Screening   Were you born outside of the [place]? No         Today's PHQ-2 Score:       6/5/2024    12:06 AM   PHQ-2 ( 1999 [employer])   Q1: Little interest or pleasure in doing things 0   Q2: Feeling down, depressed or hopeless 0   PHQ-2 Score 0   Q1: Little interest or pleasure in doing things Not at all   Q2: Feeling down, depressed or hopeless Not at all   PHQ-2 Score 0           6/5/2024   Substance Use   Alcohol more than 3/day or more than 7/wk No   Do you have a current opioid prescription? No   How severe/bad is pain from 1 to 10? 0/10 (No Pain)   Do you use any other substances recreationally? No     Social History     Tobacco Use    Smoking status: Never     Passive exposure: Never    Smokeless tobacco: Never   Vaping Use    Vaping status: Never Used   Substance Use Topics    Alcohol use: Yes    Drug use: No           6/5/2024   AAA Screening   Family history of Abdominal Aortic Aneurysm (AAA)? No   ASCVD Risk   The 10-year ASCVD risk score (Shady DONAHUE, et al., 2019) is: 29.4%    Values used to calculate the score:      Age: 73 years      Sex: Male      Is Non- : No      Diabetic: No      Tobacco  "smoker: No      Systolic Blood Pressure: 147 mmHg      Is BP treated: Yes      HDL Cholesterol: 53 mg/dL      Total Cholesterol: 180 mg/dL            Reviewed and updated as needed this visit by Provider   Tobacco  Allergies  Meds  Problems  Med Hx  Surg Hx  Fam Hx            Current providers sharing in care for this patient include:  Patient Care Team:  Darlene Castaneda MD as PCP - General (Family Medicine)  Darlene Castaneda MD as Assigned PCP  Vinnie Madden MD as MD (Urology)    The following health maintenance items are reviewed in Epic and correct as of today:  Health Maintenance   Topic Date Due    CYSTIC FIBROSIS ANNUAL STUDY  Never done    DEXA  Never done    RSV VACCINE (Pregnancy & 60+) (1 - 1-dose 60+ series) Never done    MEDICARE ANNUAL WELLNESS VISIT  05/11/2023    COVID-19 Vaccine (7 - 2023-24 season) 03/02/2024    LIPID  08/29/2024    ANNUAL REVIEW OF HM ORDERS  08/29/2024    FALL RISK ASSESSMENT  06/05/2025    DTAP/TDAP/TD IMMUNIZATION (3 - Td or Tdap) 05/06/2026    GLUCOSE  01/16/2027    CYSTIC FIBROSIS: COLONOSCOPY  01/24/2027    ADVANCE CARE PLANNING  05/11/2027    COLORECTAL CANCER SCREENING  01/24/2029    HEPATITIS C SCREENING  Completed    PHQ-2 (once per calendar year)  Completed    INFLUENZA VACCINE  Completed    Pneumococcal Vaccine: 65+ Years  Completed    ZOSTER IMMUNIZATION  Completed    IPV IMMUNIZATION  Aged Out    HPV IMMUNIZATION  Aged Out    MENINGITIS IMMUNIZATION  Aged Out    RSV MONOCLONAL ANTIBODY  Aged Out         Review of Systems  Constitutional, HEENT, cardiovascular, pulmonary, GI, , musculoskeletal, neuro, skin, endocrine and psych systems are negative, except as otherwise noted.     Objective    Exam  BP (!) 141/84   Pulse (!) 47   Temp 98  F (36.7  C) (Oral)   Resp 16   Ht 1.727 m (5' 8\")   Wt 78.9 kg (174 lb)   SpO2 99%   BMI 26.46 kg/m     Estimated body mass index is 26.46 kg/m  as calculated from the following:    Height as of this encounter: 1.727 m " "(5' 8\").    Weight as of this encounter: 78.9 kg (174 lb).    Physical Exam    GENERAL: alert and no distress  EYES: Eyes grossly normal to inspection, skin under eyes bilaterally slightly puffy.  HENT: ear canals and TM's normal, nose and mouth without ulcers or lesions  NECK: no adenopathy, no asymmetry, masses, or scars  RESP: lungs clear to auscultation - no rales, rhonchi or wheezes  CV: regular rate and rhythm, no murmurs   ABDOMEN: soft, nontender, no hepatosplenomegaly, no masses and bowel sounds normal  MS: no gross musculoskeletal defects noted, no edema  SKIN: no suspicious lesions or rashes  NEURO: Normal strength and tone, mentation intact and speech normal  PSYCH: mentation appears normal, affect normal/bright        6/5/2024   Mini Cog   Clock Draw Score 2 Normal   3 Item Recall 3 objects recalled   Mini Cog Total Score 5              Signed Electronically by: Alea Estes,     "

## 2024-06-06 ENCOUNTER — PATIENT OUTREACH (OUTPATIENT)
Dept: GASTROENTEROLOGY | Facility: CLINIC | Age: 74
End: 2024-06-06
Payer: COMMERCIAL

## 2024-06-06 LAB
ALBUMIN SERPL BCG-MCNC: 4.2 G/DL (ref 3.5–5.2)
ALP SERPL-CCNC: 81 U/L (ref 40–150)
ALT SERPL W P-5'-P-CCNC: 14 U/L (ref 0–70)
ANION GAP SERPL CALCULATED.3IONS-SCNC: 7 MMOL/L (ref 7–15)
AST SERPL W P-5'-P-CCNC: 22 U/L (ref 0–45)
BILIRUB SERPL-MCNC: 0.3 MG/DL
BUN SERPL-MCNC: 19 MG/DL (ref 8–23)
CALCIUM SERPL-MCNC: 9.4 MG/DL (ref 8.8–10.2)
CHLORIDE SERPL-SCNC: 108 MMOL/L (ref 98–107)
CHOLEST SERPL-MCNC: 215 MG/DL
CREAT SERPL-MCNC: 0.92 MG/DL (ref 0.67–1.17)
DEPRECATED HCO3 PLAS-SCNC: 27 MMOL/L (ref 22–29)
EGFRCR SERPLBLD CKD-EPI 2021: 88 ML/MIN/1.73M2
FASTING STATUS PATIENT QL REPORTED: YES
FASTING STATUS PATIENT QL REPORTED: YES
GLUCOSE SERPL-MCNC: 87 MG/DL (ref 70–99)
HDLC SERPL-MCNC: 54 MG/DL
LDLC SERPL CALC-MCNC: 140 MG/DL
NONHDLC SERPL-MCNC: 161 MG/DL
POTASSIUM SERPL-SCNC: 5 MMOL/L (ref 3.4–5.3)
PROT SERPL-MCNC: 7 G/DL (ref 6.4–8.3)
SODIUM SERPL-SCNC: 142 MMOL/L (ref 135–145)
TRIGL SERPL-MCNC: 105 MG/DL
TSH SERPL DL<=0.005 MIU/L-ACNC: 3.67 UIU/ML (ref 0.3–4.2)

## 2024-06-07 NOTE — RESULT ENCOUNTER NOTE
The 10-year ASCVD risk score (Shady DONAHUE, et al., 2019) is: 29.2%  Patient updated by [employer] message with lab results.      Deangelo Robb,  Your labs have returned.   1. Your cholesterol numbers are up from last check. We no longer treat cholesterol just to numbers, rather estimated risk of cardiovascular disease. Your 10 year estimated risk is at 29.2%. Improving your blood pressure control will help reduce this risk some. I would also work on a heart healthy and nutrient dense diet, like a mediterranean style diet, and regular physical activity to help reduce your numbers. Given your elevated risk, it would be reasonable to consider adding a cholesterol lowering medication (statin) to help reduce your risk as well. Please let me know if you are open to a trial of a statin, or have additional questions.  2. Remaining labs look good.  Please reach out with follow up questions or concerns.  Alea Estes, DO

## 2024-06-18 ENCOUNTER — OFFICE VISIT (OUTPATIENT)
Dept: UROLOGY | Facility: CLINIC | Age: 74
End: 2024-06-18
Payer: COMMERCIAL

## 2024-06-18 VITALS
SYSTOLIC BLOOD PRESSURE: 162 MMHG | DIASTOLIC BLOOD PRESSURE: 93 MMHG | HEART RATE: 49 BPM | OXYGEN SATURATION: 99 % | BODY MASS INDEX: 26.7 KG/M2 | WEIGHT: 175.6 LBS

## 2024-06-18 DIAGNOSIS — I10 HYPERTENSION GOAL BP (BLOOD PRESSURE) < 140/90: ICD-10-CM

## 2024-06-18 DIAGNOSIS — N48.0 BXO (BALANITIS XEROTICA OBLITERANS): Primary | ICD-10-CM

## 2024-06-18 PROCEDURE — 99203 OFFICE O/P NEW LOW 30 MIN: CPT | Performed by: UROLOGY

## 2024-06-18 NOTE — PROGRESS NOTES
S: Patient is a pleasant 73-year-old male who was seen for a consultation with regard to patient's penile foreskin.  Patient complains of irritation of his penile foreskin for about a year.  He complains of difficulty with retracting it.  He has used steroid cream intermittently.  It does help with the problem but as soon as he stops using it it comes back again.  However he uses it every several days or so.     Current Outpatient Medications   Medication Sig Dispense Refill    amLODIPine (NORVASC) 2.5 MG tablet Take 1 tablet (2.5 mg) by mouth daily 90 tablet 3    atenolol (TENORMIN) 50 MG tablet Take 0.5 tablets (25 mg) by mouth daily Pharmacy to inform patient that he is due to see his Provider      COLLAGEN PO       ferrous sulfate (FEROSUL) 325 (65 Fe) MG tablet Take 1 tablet (325 mg) by mouth daily (with breakfast) 90 tablet 0    hydrocortisone, Perianal, (HYDROCORTISONE) 2.5 % cream Place rectally 2 times daily as needed for hemorrhoids 28 g 1    triamcinolone (ARISTOCORT HP) 0.5 % external cream       vitamin B1 (THIAMINE) 250 MG tablet Take 250 mg by mouth daily      Vitamin D, Cholecalciferol, 10 MCG (400 UNIT) TABS       ketorolac (ACULAR) 0.5 % ophthalmic solution Apply 1 drop to eye (Patient not taking: Reported on 6/18/2024)      pravastatin (PRAVACHOL) 20 MG tablet Take 1 tablet (20 mg) by mouth daily (Patient not taking: Reported on 2/15/2022) 30 tablet 11     Allergies   Allergen Reactions    Lisinopril Swelling    Cipro [Quinolones]      Puts patient to sleep    Hydrochlorothiazide Other (See Comments)     HYPOKALEMIA    Levaquin      Puts patient to sleep    Prednisone Other (See Comments)     Facial edema    Sulfamethizole Unknown     Past Medical History:   Diagnosis Date    Chronic neck pain     Cervical radiculopathy    Eczema     ED (erectile dysfunction)     HTN     Borderline    Narcolepsy     Other and unspecified hyperlipidemia     Vertigo 06/20/2013     Past Surgical History:   Procedure  Laterality Date    TONSILLECTOMY  1959      Family History   Problem Relation Age of Onset    Hypertension Mother     Hypertension Father     Unknown/Adopted Maternal Grandmother     Unknown/Adopted Maternal Grandfather     Unknown/Adopted Paternal Grandmother     Unknown/Adopted Paternal Grandfather     Glaucoma No family hx of     Macular Degeneration No family hx of      Social History     Socioeconomic History    Marital status:      Spouse name: None    Number of children: None    Years of education: None    Highest education level: None   Tobacco Use    Smoking status: Never     Passive exposure: Never    Smokeless tobacco: Never   Vaping Use    Vaping status: Never Used   Substance and Sexual Activity    Alcohol use: Yes    Drug use: No    Sexual activity: Yes     Partners: Female     Social Determinants of Health     Financial Resource Strain: Low Risk  (6/5/2024)    Financial Resource Strain     Within the past 12 months, have you or your family members you live with been unable to get utilities (heat, electricity) when it was really needed?: No   Food Insecurity: Low Risk  (6/5/2024)    Food Insecurity     Within the past 12 months, did you worry that your food would run out before you got money to buy more?: No     Within the past 12 months, did the food you bought just not last and you didn t have money to get more?: No   Transportation Needs: Low Risk  (6/5/2024)    Transportation Needs     Within the past 12 months, has lack of transportation kept you from medical appointments, getting your medicines, non-medical meetings or appointments, work, or from getting things that you need?: No   Physical Activity: Sufficiently Active (6/5/2024)    Exercise Vital Sign     Days of Exercise per Week: 6 days     Minutes of Exercise per Session: 40 min   Stress: No Stress Concern Present (6/5/2024)    [employer] - Occupational Stress Questionnaire     Feeling of Stress : Not at  all   Social Connections: Unknown (6/5/2024)    Social Connection and Isolation Panel [NHANES]     Frequency of Social Gatherings with Friends and Family: Once a week   Interpersonal Safety: Low Risk  (6/5/2024)    Interpersonal Safety     Do you feel physically and emotionally safe where you currently live?: Yes     Within the past 12 months, have you been hit, slapped, kicked or otherwise physically hurt by someone?: No     Within the past 12 months, have you been humiliated or emotionally abused in other ways by your partner or ex-partner?: No   Housing Stability: Low Risk  (6/5/2024)    Housing Stability     Do you have housing? : Yes     Are you worried about losing your housing?: No       REVIEW OF SYSTEMS  =================  C: NEGATIVE for fever, chills, change in weight  I: NEGATIVE for worrisome rashes, moles or lesions  E/M: NEGATIVE for ear, mouth and throat problems  R: NEGATIVE for significant cough or SHORTNESS OF BREATH  CV:  NEGATIVE for chest pain, palpitations or peripheral edema  GI: NEGATIVE for nausea, abdominal pain, heartburn, or change in bowel habits  NEURO: NEGATIVE numbness/weakness  : see HPI  PSYCH: NEGATIVE depression/anxiety  LYmph: no new enlarged lymph nodes  Ortho: no new trauma/movements      Physical Exam:  BP (!) 162/93   Pulse (!) 49   SpO2 99%    Patient is pleasant, in no acute distress, good general condition.  Heart:  negative, PMI normal  Lung: no evidence of respiratory distress    Abdomen: Soft, nondistended, non tender. No masses. No rebound or guarding.   Exam: Penis with mild phimosis and whitish foreskin typical of BXO.  Penile gland looks okay.  There is no penile discharge.  Testes no masses.  No scrotal skin lesion.  Skin: Warm and dry.  No redness.  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  Treatment options discussed with patient today.  We discussed medical management versus surgical option.  At this time I like the patient to use his steroid cream more consistently twice daily for 2 weeks and see what happened.  If problems have not resolved he can contact me for elective circumcision.    HTN: Cezar to follow up with Primary Care provider regarding elevated blood pressure.

## 2024-06-18 NOTE — PROGRESS NOTES
"Patient presents to the clinic today for No diagnosis found.  Patient relays the following information: \"Tightening skin at foreskin, painful, no other urinary symptoms.\"        Glenda BARTHOLOMEW RN Urology 6/18/2024 7:56 AM      "

## 2024-06-20 ENCOUNTER — TELEPHONE (OUTPATIENT)
Dept: FAMILY MEDICINE | Facility: CLINIC | Age: 74
End: 2024-06-20
Payer: COMMERCIAL

## 2024-06-20 NOTE — TELEPHONE ENCOUNTER
Patient states the last Bp was like 180/85 he couldn't remember pulse    Update patient reported vitals    Would you like to see his back for an appt to discuss bp?

## 2024-06-20 NOTE — TELEPHONE ENCOUNTER
----- Message from Alea Estes sent at 6/5/2024 10:17 AM CDT -----  Please check in with Cezar for update on home BP readings (and pulse if available) since visit.  We had planned to decrease atenolol to half tablet daily and add amlodipine 2.5mg daily.   Alea Estes, DO

## 2024-06-21 NOTE — TELEPHONE ENCOUNTER
He is following the plan as instructed.  Blood pressures look good.  Continue current dose of amlodipine.  Continue OFF atenolol as we had planned to wean off.  If blood pressure consistently greater than 140 systolic or 90 diastolic, he should reach out and we will plan to increase dose of the amlodipine.  Alea Estes, DO

## 2024-06-21 NOTE — TELEPHONE ENCOUNTER
Relayed providers message below. Patient will continue to monitor and will call clinic if blood pressure is 140/90 or higher.

## 2024-06-21 NOTE — TELEPHONE ENCOUNTER
This seems quite high.  Can we verify if he has any additional home readings?  At this point, I like him to check 3 times a day over the next few days for more information.  If this indeed was his blood pressure, please have him increase his amlodipine from 2.5 mg daily to 5 mg daily.  It is also important to have a pulse because we reduce his atenolol due to bradycardia.  Please have him provide this information as well.  We may need to increase his atenolol if his blood pressure increases significantly.  We can follow-up early next week on home readings.  Alea Estes, DO

## 2024-06-21 NOTE — TELEPHONE ENCOUNTER
"Incoming call from patient he states that he believes he was using his BP cuff incorrectly when he got the 180/85 reading. He took his BP twice in the last 10 minutes and it was 132/82 and 121/76. Patient is currently taking 2.5 of amlodipine. He stopped taking his atenolol all together yesterday. Advised patient it is not safe to stop taking medication without provider approval. Please advise.      OV notes from 6/5-   \"Recommend we add in amlodipine 2.5 mg daily and decrease atenolol from 50 mg daily to 25 mg daily (can cut tablets in half). We will obtain update with home blood pressure readings in 2 weeks. Depending upon his tolerance of medication, heart rate, and blood pressure, we could discontinue atenolol and continue to monitor or we could discontinue atenolol and increase dose of amlodipine to cross-taper fully over to calcium channel blocker instead of beta-blocker. If he is doing well on the combination, we could continue as well. Await response.\"   "

## 2024-06-21 NOTE — TELEPHONE ENCOUNTER
Called patient to review provides message below. Patient stated he could not talk at the moment as he was driving. Stated he will call back to get providers message. Please relay message below fully to patient when he returns call.

## 2024-07-10 ENCOUNTER — TELEPHONE (OUTPATIENT)
Dept: FAMILY MEDICINE | Facility: CLINIC | Age: 74
End: 2024-07-10
Payer: COMMERCIAL

## 2024-07-10 NOTE — TELEPHONE ENCOUNTER
Reached out to patient he is not taking atenolol just 2.5mg of amlodipine. His last blood pressure a few hours ago was 140/82 and pulse was  60. The symptoms of leg tingling and swelling have been more gradual over last few weeks. Did not come on abruptly and he feels more fatigued last few weeks.     Merced Otto RN              We were tapering off atenolol due to significant bradycardia.    Does patient have any updated blood pressures or heart rates to report?  What dose of atenolol and amlodipine is he currently taking?    Did symptoms come on abruptly or gradual change?    Additional treatment options are going to be limited as he also has not tolerated ACE inhibitors or thiazide diuretics in the past.    Alea Estes, DO

## 2024-07-10 NOTE — TELEPHONE ENCOUNTER
Please instruct patient to discontinue the amlodipine and monitor symptoms and blood pressure over the next several days.   We will reach out early next week for update on home readings.   Alea Estes, DO

## 2024-07-10 NOTE — TELEPHONE ENCOUNTER
This call is related to a medication question due to the side effects patient has been having with Amlodipine and Cezar's desire to change BP medication back to Atenolol.  Symptoms    Describe your symptoms: Reaction of the past week: tingling in his legs, a little leg swelling.  More fatigued than normal- both are side effects from Amlodipine.    Any pain: No    How long have you been having symptoms: last 2  weeks    Have you been seen for this:  No    Appointment offered?: No    Triage offered?: Yes: But patient desires Dr Estes to advise on changing BP medication back to Atenolol.  Blood pressure the past few days were normal- but Cezar does not have access to his machine at this time to give specific readings. He does have Atenolol left and would like Dr Estes to advise on switching back ASAP as he takes his blood pressure medication in the evening.      Preferred Pharmacy:     [id number]  [address] - [address] [place] [address]  Phone: [phone] Fax: [phone]      Could we send this information to you in [employer] or would you prefer to receive a phone call?:   Patient would prefer a phone call   Okay to leave a detailed message?: Yes at Cell number on file:    Telephone Information:   Mobile [phone]

## 2024-07-10 NOTE — TELEPHONE ENCOUNTER
Called and spoke with patient, relayed PCP recommendations. Patient stated understanding and is in agreement with plan.    Luisana Lindo RN

## 2024-07-11 ENCOUNTER — OFFICE VISIT (OUTPATIENT)
Dept: OPHTHALMOLOGY | Facility: CLINIC | Age: 74
End: 2024-07-11
Payer: COMMERCIAL

## 2024-07-11 DIAGNOSIS — H02.88B MEIBOMIAN GLAND DYSFUNCTION (MGD) OF UPPER AND LOWER LIDS OF BOTH EYES: ICD-10-CM

## 2024-07-11 DIAGNOSIS — H52.4 MYOPIA OF BOTH EYES WITH ASTIGMATISM AND PRESBYOPIA: ICD-10-CM

## 2024-07-11 DIAGNOSIS — H52.13 MYOPIA OF BOTH EYES WITH ASTIGMATISM AND PRESBYOPIA: ICD-10-CM

## 2024-07-11 DIAGNOSIS — H25.13 NUCLEAR SCLEROTIC CATARACT OF BOTH EYES: ICD-10-CM

## 2024-07-11 DIAGNOSIS — H52.203 MYOPIA OF BOTH EYES WITH ASTIGMATISM AND PRESBYOPIA: ICD-10-CM

## 2024-07-11 DIAGNOSIS — Z01.00 EXAMINATION OF EYES AND VISION: Primary | ICD-10-CM

## 2024-07-11 DIAGNOSIS — H02.88A MEIBOMIAN GLAND DYSFUNCTION (MGD) OF UPPER AND LOWER LIDS OF BOTH EYES: ICD-10-CM

## 2024-07-11 PROCEDURE — 92004 COMPRE OPH EXAM NEW PT 1/>: CPT | Performed by: STUDENT IN AN ORGANIZED HEALTH CARE EDUCATION/TRAINING PROGRAM

## 2024-07-11 PROCEDURE — 92015 DETERMINE REFRACTIVE STATE: CPT | Performed by: STUDENT IN AN ORGANIZED HEALTH CARE EDUCATION/TRAINING PROGRAM

## 2024-07-11 ASSESSMENT — CONF VISUAL FIELD
OS_SUPERIOR_NASAL_RESTRICTION: 0
OS_INFERIOR_TEMPORAL_RESTRICTION: 0
OS_SUPERIOR_TEMPORAL_RESTRICTION: 0
OD_INFERIOR_TEMPORAL_RESTRICTION: 0
OD_NORMAL: 1
METHOD: COUNTING FINGERS
OD_SUPERIOR_NASAL_RESTRICTION: 0
OS_NORMAL: 1
OS_INFERIOR_NASAL_RESTRICTION: 0
OD_INFERIOR_NASAL_RESTRICTION: 0
OD_SUPERIOR_TEMPORAL_RESTRICTION: 0

## 2024-07-11 ASSESSMENT — REFRACTION_MANIFEST
OD_CYLINDER: +1.00
OS_AXIS: 167
OD_AXIS: 045
OD_ADD: +2.75
OD_SPHERE: -1.25
OS_ADD: +2.75
OS_CYLINDER: +1.00
OS_SPHERE: -1.25

## 2024-07-11 ASSESSMENT — EXTERNAL EXAM - RIGHT EYE: OD_EXAM: NORMAL

## 2024-07-11 ASSESSMENT — VISUAL ACUITY
OS_SC: 20/25
OD_SC: 20/40
OS_SC+: -3
METHOD: SNELLEN - LINEAR

## 2024-07-11 ASSESSMENT — SLIT LAMP EXAM - LIDS: COMMENTS: 1+ BLEPHARITIS, 1+ MEIBOMIAN GLAND DYSFUNCTION

## 2024-07-11 ASSESSMENT — CUP TO DISC RATIO
OD_RATIO: 0.2
OS_RATIO: 0.2

## 2024-07-11 ASSESSMENT — TONOMETRY
IOP_METHOD: APPLANATION
OS_IOP_MMHG: 19
OD_IOP_MMHG: 17

## 2024-07-11 ASSESSMENT — EXTERNAL EXAM - LEFT EYE: OS_EXAM: NORMAL

## 2024-07-11 NOTE — PROGRESS NOTES
Current Eye Medications:  None     Subjective:  Complete eye exam. Vision is doing fairly well right eye > left eye in distance and near. No eye pain or discomfort in either eye.      Objective:  See Ophthalmology Exam.       Assessment:  Cezar Ward is a 73 year old male who presents with:   Encounter Diagnoses   Name Primary?    Examination of eyes and vision      Nuclear sclerotic cataract of both eyes     Meibomian gland dysfunction (MGD) of upper and lower lids of both eyes     Myopia of both eyes with astigmatism and presbyopia         Plan:  Glasses prescription given - optional to update    Michele Moise MD  [phone]

## 2024-07-11 NOTE — LETTER
7/11/2024      Cezar Ward  [address] [address]      Dear Colleague,    Thank you for referring your patient, Cezar Ward, to the [facility]. Please see a copy of my visit note below.     Current Eye Medications:  None     Subjective:  Complete eye exam. Vision is doing fairly well right eye > left eye in distance and near. No eye pain or discomfort in either eye.      Objective:  See Ophthalmology Exam.       Assessment:  Cezar Ward is a 73 year old male who presents with:   Encounter Diagnoses   Name Primary?     Examination of eyes and vision       Nuclear sclerotic cataract of both eyes      Meibomian gland dysfunction (MGD) of upper and lower lids of both eyes      Myopia of both eyes with astigmatism and presbyopia         Plan:  Glasses prescription given - optional to update    Michele Moise MD  [phone]     Again, thank you for allowing me to participate in the care of your patient.        Sincerely,        Michele Moise MD

## 2024-07-15 ENCOUNTER — TELEPHONE (OUTPATIENT)
Dept: FAMILY MEDICINE | Facility: CLINIC | Age: 74
End: 2024-07-15
Payer: COMMERCIAL

## 2024-07-15 NOTE — TELEPHONE ENCOUNTER
Contacted patient about blood pressure readings.    Patient states blood pressures are in a good range with the most recent reading of 116/73 pulse 74.  He also states that the fatigue is better, almost back to normal.      Please send a G[employer] message to patient confirming your recommendations.  He will continue off of the blood pressure medications unless advised otherwise.

## 2024-07-15 NOTE — TELEPHONE ENCOUNTER
----- Message from Alea Estes sent at 7/10/2024  4:50 PM CDT -----  Please check in with patient, home BP readings since discontinuing amlodipine last week.  Alea Estes, DO

## 2024-07-15 NOTE — TELEPHONE ENCOUNTER
Perfect, glad to hear the update. I would discontinue the amlodipine and continue to monitor intermittently. We can re-evaluate the plan if his home readings are persistently greater than 140 systolic or 90 diastolic.  Alea Estes, DO

## 2024-07-30 ENCOUNTER — OFFICE VISIT (OUTPATIENT)
Dept: FAMILY MEDICINE | Facility: CLINIC | Age: 74
End: 2024-07-30
Payer: COMMERCIAL

## 2024-07-30 VITALS
BODY MASS INDEX: 24.85 KG/M2 | HEART RATE: 64 BPM | OXYGEN SATURATION: 99 % | DIASTOLIC BLOOD PRESSURE: 87 MMHG | WEIGHT: 167.8 LBS | SYSTOLIC BLOOD PRESSURE: 148 MMHG | TEMPERATURE: 98 F | RESPIRATION RATE: 20 BRPM | HEIGHT: 69 IN
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
## 2024-07-30 DIAGNOSIS — I10 HYPERTENSION GOAL BP (BLOOD PRESSURE) < 140/90: ICD-10-CM

## 2024-07-30 DIAGNOSIS — E78.5 HYPERLIPIDEMIA, UNSPECIFIED HYPERLIPIDEMIA TYPE: ICD-10-CM

## 2024-07-30 DIAGNOSIS — Z01.818 PREOP GENERAL PHYSICAL EXAM: Primary | ICD-10-CM

## 2024-07-30 DIAGNOSIS — S52.502D CLOSED FRACTURE OF DISTAL END OF LEFT RADIUS WITH ROUTINE HEALING, UNSPECIFIED FRACTURE MORPHOLOGY, SUBSEQUENT ENCOUNTER: ICD-10-CM

## 2024-07-30 PROCEDURE — 99214 OFFICE O/P EST MOD 30 MIN: CPT | Performed by: PHYSICIAN ASSISTANT

## 2024-07-30 PROCEDURE — 93000 ELECTROCARDIOGRAM COMPLETE: CPT | Performed by: PHYSICIAN ASSISTANT

## 2024-07-30 NOTE — PATIENT INSTRUCTIONS
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
## 2024-08-06 ENCOUNTER — TELEPHONE (OUTPATIENT)
Dept: FAMILY MEDICINE | Facility: CLINIC | Age: 74
End: 2024-08-06
Payer: COMMERCIAL

## 2024-08-06 NOTE — TELEPHONE ENCOUNTER
Patient Returning Call    Reason for call:  Patient is asking when he coming in to do labs at 10:30am  08/08 could the provider make time to remove 4 stitches above eye so that he don't have to come back at 2 pm     Information relayed to patient:      Patient has additional questions:  No      Could we send this information to you in [employer] or would you prefer to receive a phone call?:   Patient would prefer a phone call   Okay to leave a detailed message?: Yes at Cell number on file:    Telephone Information:   Mobile [phone]

## 2024-08-07 ENCOUNTER — OFFICE VISIT (OUTPATIENT)
Dept: FAMILY MEDICINE | Facility: CLINIC | Age: 74
End: 2024-08-07
Payer: COMMERCIAL

## 2024-08-07 VITALS
WEIGHT: 163.6 LBS | DIASTOLIC BLOOD PRESSURE: 98 MMHG | HEIGHT: 69 IN | BODY MASS INDEX: 24.23 KG/M2 | SYSTOLIC BLOOD PRESSURE: 169 MMHG | TEMPERATURE: 97.8 F | OXYGEN SATURATION: 100 % | HEART RATE: 60 BPM

## 2024-08-07 DIAGNOSIS — Z48.02 VISIT FOR SUTURE REMOVAL: ICD-10-CM
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
## 2024-08-07 DIAGNOSIS — I10 HYPERTENSION GOAL BP (BLOOD PRESSURE) < 140/90: ICD-10-CM

## 2024-08-07 DIAGNOSIS — S52.502D CLOSED FRACTURE OF DISTAL END OF LEFT RADIUS WITH ROUTINE HEALING, UNSPECIFIED FRACTURE MORPHOLOGY, SUBSEQUENT ENCOUNTER: ICD-10-CM

## 2024-08-07 LAB
ERYTHROCYTE [DISTWIDTH] IN BLOOD BY AUTOMATED COUNT: 14.1 % (ref 10–15)
FERRITIN SERPL-MCNC: 256 NG/ML (ref 31–409)
HCT VFR BLD AUTO: 39.2 % (ref 40–53)
HGB BLD-MCNC: 12.6 G/DL (ref 13.3–17.7)
MCH RBC QN AUTO: 30.1 PG (ref 26.5–33)
MCHC RBC AUTO-ENTMCNC: 32.1 G/DL (ref 31.5–36.5)
MCV RBC AUTO: 94 FL (ref 78–100)
PLATELET # BLD AUTO: 285 10E3/UL (ref 150–450)
RBC # BLD AUTO: 4.18 10E6/UL (ref 4.4–5.9)
WBC # BLD AUTO: 5.7 10E3/UL (ref 4–11)

## 2024-08-07 PROCEDURE — G2211 COMPLEX E/M VISIT ADD ON: HCPCS | Performed by: FAMILY MEDICINE

## 2024-08-07 PROCEDURE — 99213 OFFICE O/P EST LOW 20 MIN: CPT | Performed by: FAMILY MEDICINE

## 2024-08-07 PROCEDURE — 36415 COLL VENOUS BLD VENIPUNCTURE: CPT | Performed by: FAMILY MEDICINE

## 2024-08-07 PROCEDURE — 85027 COMPLETE CBC AUTOMATED: CPT | Performed by: FAMILY MEDICINE

## 2024-08-07 PROCEDURE — 82728 ASSAY OF FERRITIN: CPT | Performed by: FAMILY MEDICINE

## 2024-08-07 RX ORDER — AMLODIPINE BESYLATE 2.5 MG/1
2.5 TABLET ORAL DAILY
Qty: 30 TABLET | Refills: 0 | Status: SHIPPED | OUTPATIENT
Start: 2024-08-07
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
## 2024-08-07 NOTE — RESULT ENCOUNTER NOTE
Patient updated by [employer]hart message with lab results.      Deangelo Robb,  Iron level improved.  MCV improved.  Hemoglobin stable from last check.  Did you have any recent bleeding with your injury/surgery?  With improvement in ferritin and MCV, okay to discontinue iron supplementation at this time so long as you have not had recurrent rectal bleeding.  Alea Estes, DO

## 2024-10-08 ENCOUNTER — HOSPITAL ENCOUNTER (OUTPATIENT)
Dept: CT IMAGING | Facility: CLINIC | Age: 74
Discharge: HOME | End: 2024-10-08
Attending: PHYSICIAN ASSISTANT
Payer: COMMERCIAL

## 2024-10-08 ENCOUNTER — HOSPITAL ENCOUNTER (OUTPATIENT)
Dept: GENERAL RADIOLOGY | Facility: CLINIC | Age: 74
Discharge: HOME | End: 2024-10-08
Attending: PHYSICIAN ASSISTANT
Payer: COMMERCIAL

## 2024-10-08 ENCOUNTER — TELEPHONE (OUTPATIENT)
Dept: FAMILY MEDICINE | Facility: CLINIC | Age: 74
End: 2024-10-08

## 2024-10-08 ENCOUNTER — OFFICE VISIT (OUTPATIENT)
Dept: FAMILY MEDICINE | Facility: CLINIC | Age: 74
End: 2024-10-08
Payer: COMMERCIAL

## 2024-10-08 VITALS
SYSTOLIC BLOOD PRESSURE: 146 MMHG | DIASTOLIC BLOOD PRESSURE: 98 MMHG | WEIGHT: 163 LBS | BODY MASS INDEX: 24.14 KG/M2 | HEART RATE: 66 BPM | OXYGEN SATURATION: 100 % | TEMPERATURE: 97.5 F | HEIGHT: 69 IN | RESPIRATION RATE: 16 BRPM

## 2024-10-08 DIAGNOSIS — S09.90XA TRAUMATIC INJURY OF HEAD, INITIAL ENCOUNTER: ICD-10-CM
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
## 2024-10-08 DIAGNOSIS — R42 DIZZINESS: ICD-10-CM
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
## 2024-10-08 PROBLEM — E84.0 CYSTIC FIBROSIS OF THE LUNG (H): Status: ACTIVE | Noted: 2024-10-08

## 2024-10-08 PROCEDURE — 72220 X-RAY EXAM SACRUM TAILBONE: CPT

## 2024-10-08 PROCEDURE — 72100 X-RAY EXAM L-S SPINE 2/3 VWS: CPT

## 2024-10-08 PROCEDURE — 70450 CT HEAD/BRAIN W/O DYE: CPT | Mod: 26 | Performed by: RADIOLOGY

## 2024-10-08 PROCEDURE — 70450 CT HEAD/BRAIN W/O DYE: CPT

## 2024-10-08 PROCEDURE — 99214 OFFICE O/P EST MOD 30 MIN: CPT | Performed by: PHYSICIAN ASSISTANT

## 2024-10-08 ASSESSMENT — PAIN SCALES - GENERAL: PAINLEVEL: NO PAIN (1)

## 2024-10-08 NOTE — TELEPHONE ENCOUNTER
RN called and spoke with patient.    RN reviewed providers message.    Patient verbalized understanding.    José Miguel Morales RN, BSN, PHN  [facility]

## 2024-10-08 NOTE — TELEPHONE ENCOUNTER
----- Message from Beba Quinonez sent at 10/8/2024  1:42 PM CDT -----  Please call patient and inform him that his CT scan of the head was normal. No acute findings such as a bleed    Thank you,  Beba Quinonez PA-C

## 2024-10-08 NOTE — PROGRESS NOTES
Assessment & Plan     Fall, initial encounter  Patient presents for follow up a fall about 4 days ago. He has been experiencing lower back and buttocks pain, dizziness, vision changes. He is neurologically intact today in clinic. However, due to some of his symptoms I do recommend ruling out a brain bleed post fall. CT scan ordered today. Patient is stable to drive himself to the CT scan. In addition will get xray's today to rule out fracture of spine.   Addendum : CT scan normal, xray's also did not show any acute changes. Discussed conservative treatment. Symptoms should improve over the next few weeks. If symptoms worsen or change please follow up in clinic.   - XR Sacrum and Coccyx 2 Views; Future  - XR Lumbar Spine 2/3 Views; Future    Dizziness  CT scan of the head was normal. No acute findings. Discussed with patient likely BPPV. Conservative treatment discussed. Return to regular activity as tolerated and recommend nothing that could cause another head injury. Patient declined meclizine today. Warning signs to go to ER educated to patient. Follow up in clinic as needed.  - CT Head w/o Contrast; Future    Traumatic injury of head, initial encounter  See plan above  - CT Head w/o Contrast; Future                Subjective   Cezar is a 74 year old, presenting for the following health issues:  Tailbone Pain    History of Present Illness       Back Pain:  He presents for follow up of back pain. Patient's back pain is a new problem.    Original cause of back pain: a fall  First noticed back pain: in the last week  Patient feels back pain: comes and goesLocation of back pain:  Right buttock and left buttock  Description of back pain: gnawing  Back pain spreads: right buttocks and left buttocks    Since patient first noticed back pain, pain is: gradually worsening  Does back pain interfere with his job:  Not applicable  On a scale of 1-10 (10 being the worst), patient describes pain as:  7  What makes back pain  "worse: certain positions, lying down and sitting   Acupuncture: not tried  Acetaminophen: helpful  Activity or exercise: not helpful  Chiropractor:  Not tried  Cold: not tried  Heat: helpful  Massage: not tried  Muscle relaxants: not tried  NSAIDS: not tried  Opioids: not tried  Physical Therapy: not tried  Rest: helpful  Steroid Injection: not tried  Stretching: helpful  Surgery: not tried  TENS unit: not tried  Topical pain relievers: not tried  Other healthcare providers patient is seeing for back pain: None   He is taking medications regularly.       Fall : was on a ladder last week. When he was getting off he missed the last step and fell backward on to his buttocks and lower back. Since the fall he has having achy pain in the buttocks. In addition he thinks he hit the back of the head. It has been sore on the back of his head.  He had a large bruise on the right buttocks. The right eye he had some blurry vision right after the fall but nothing since. The pain has been worsening. No nausea or vomiting. No balance issues since then. Also, after lying down he has been getting a few seconds of vertigo. And then getting up to stand \"I feel vertigo\".              Review of Systems  Constitutional, HEENT, cardiovascular, pulmonary, gi and gu systems are negative, except as otherwise noted.      Objective    BP (!) 148/90   Pulse 66   Temp 97.5  F (36.4  C) (Oral)   Resp 16   Ht 1.74 m (5' 8.5\")   Wt 73.9 kg (163 lb)   SpO2 100%   BMI 24.42 kg/m    Body mass index is 24.42 kg/m .  Physical Exam   GENERAL: alert and no distress  EYES: Eyes grossly normal to inspection, PERRL and conjunctivae and sclerae normal  HENT: ear canals and TM's normal, nose and mouth without ulcers or lesions  NECK: no adenopathy, no asymmetry, masses, or scars  RESP: lungs clear to auscultation - no rales, rhonchi or wheezes  CV: regular rate and rhythm, normal S1 S2, no S3 or S4, no murmur, click or rub, no peripheral edema  ABDOMEN: " soft, nontender, no hepatosplenomegaly, no masses and bowel sounds normal  ORTHO: BACK - no bony deformity, tender to palpation along paraspinal lumbar muscles, tenderness along midline of sacrum and bilateral gluteus asya, decreased ROM of flexion, strength intact, reflex's 2+, sensation intact   SKIN: no suspicious lesions or rashes  NEURO: Normal strength and tone, sensory exam grossly normal, mentation intact, and cranial nerves 2-12 intact  PSYCH: mentation appears normal, affect normal/bright            Signed Electronically by: BEBETO Newton

## 2024-10-10 ENCOUNTER — TELEPHONE (OUTPATIENT)
Dept: FAMILY MEDICINE | Facility: CLINIC | Age: 74
End: 2024-10-10
Payer: COMMERCIAL

## 2024-10-10 DIAGNOSIS — M43.10 RETROLISTHESIS: Primary | ICD-10-CM

## 2024-10-10 DIAGNOSIS — M54.50 ACUTE LOW BACK PAIN DUE TO TRAUMA: ICD-10-CM

## 2024-10-10 DIAGNOSIS — G89.11 ACUTE LOW BACK PAIN DUE TO TRAUMA: ICD-10-CM

## 2024-10-10 NOTE — TELEPHONE ENCOUNTER
RN called patient/family and relayed provider's message. Patient/family verbalized understanding.     They are wondering if PT referral would be appropriate. Willing to order?    Jessica Piper RN, BSN  [facility] [place]

## 2024-10-10 NOTE — TELEPHONE ENCOUNTER
----- Message from Beba Quinonez sent at 10/10/2024  8:28 AM CDT -----  Please call patient and inform him that his xray's came back with no fractures or dislocations. He does have some degenerative changes throughout and some vertebral spine is changes which is likely chronic. Follow up as needed    Thank you,  Beba Quinonez PA-C

## 2024-10-10 NOTE — TELEPHONE ENCOUNTER
Yes, I think PHYSICAL THERAPY is always beneficial for pain and chronic musculoskeletal conditions. Referral placed      Thank you,  Beba Quinonez PA-C

## 2024-10-10 NOTE — TELEPHONE ENCOUNTER
RN called patient/family and relayed provider's message. Patient/family verbalized understanding.     Jessica Piper RN, BSN  [facility] [place]

## 2024-10-14 ENCOUNTER — THERAPY VISIT (OUTPATIENT)
Dept: PHYSICAL THERAPY | Facility: CLINIC | Age: 74
End: 2024-10-14
Attending: PHYSICIAN ASSISTANT
Payer: COMMERCIAL

## 2024-10-14 DIAGNOSIS — M54.50 ACUTE LOW BACK PAIN DUE TO TRAUMA: ICD-10-CM

## 2024-10-14 DIAGNOSIS — M43.10 RETROLISTHESIS: ICD-10-CM

## 2024-10-14 DIAGNOSIS — G89.11 ACUTE LOW BACK PAIN DUE TO TRAUMA: ICD-10-CM

## 2024-10-14 PROCEDURE — 97140 MANUAL THERAPY 1/> REGIONS: CPT | Mod: GP | Performed by: PHYSICAL THERAPIST

## 2024-10-14 PROCEDURE — 97161 PT EVAL LOW COMPLEX 20 MIN: CPT | Mod: GP | Performed by: PHYSICAL THERAPIST

## 2024-10-14 PROCEDURE — 97110 THERAPEUTIC EXERCISES: CPT | Mod: GP | Performed by: PHYSICAL THERAPIST

## 2024-10-14 NOTE — PROGRESS NOTES
PHYSICAL THERAPY EVALUATION  Type of Visit: Evaluation       Fall Risk Screen:  Fall screen completed by: PT  Have you fallen 2 or more times in the past year?: Yes  Have you fallen and had an injury in the past year?: Yes  Is patient a fall risk?: No  Fall screen comments: fell from a bike, and off of a ladder due to a mistep    Subjective   I fell off of a ladder while using a -8days ago. I landed on my bottom. 4/10 lately.       Presenting condition or subjective complaint: a weekago I fell on my rear and now get a pain on the muscle around my tail bone  Date of onset: 25    Relevant medical history:     Dates & types of surgery:      Prior diagnostic imaging/testing results: X-ray     Prior therapy history for the same diagnosis, illness or injury: No      Prior Level of Function  Transfers: Independent  Ambulation: Independent  ADL: Independent  IADL: Driving, Finances, Housekeeping, Laundry, Meal preparation, Medication management    Living Environment  Social support: With a significant other or spouse   Type of home: House   Stairs to enter the home: Yes 10 Is there a railing: Yes     Ramp: No   Stairs inside the home: Yes 10 Is there a railing: Yes     Help at home: None  Equipment owned:       Employment: Not Applicable    Hobbies/Interests:      Patient goals for therapy:      Pain assessment: Location: gluts/LB /Ratin/10      Objective   LUMBAR SPINE EVALUATION  PAIN: Pain is Exacerbated By: transfers/standing,   INTEGUMENTARY (edema, incisions):   POSTURE: WFL   GAIT:   Weightbearing Status:   Assistive Device(s):   Gait Deviations:   BALANCE/PROPRIOCEPTION: WNL  WEIGHTBEARING ALIGNMENT:  Rt crest elevated/left depressed   NON-WEIGHTBEARING ALIGNMENT:    ROM: AROM WFL  PROM WFL  PELVIC/SI SCREEN: +left SI dysf ant rot, flare, ++AP glide   STRENGTH: WNL    MYOTOMES:   DTR S:   CORD SIGNS:   DERMATOMES:   NEURAL TENSION: Neg   FLEXIBILITY:   LUMBAR/HIP Special Tests:  NT   PELVIS/SI  SPECIAL TESTS: left ant rot, flare, AP   FUNCTIONAL TESTS:   PALPATION: neg   SPINAL SEGMENTAL CONCLUSIONS:       Assessment & Plan   CLINICAL IMPRESSIONS  Medical Diagnosis: low back pain due to trauma    Treatment Diagnosis: LBP due to trauma   Impression/Assessment: Patient is a 74 year old male with post trauma LBP complaints.  The following significant findings have been identified: Pain, Decreased ROM/flexibility, Impaired muscle performance, and Decreased activity tolerance. These impairments interfere with their ability to perform self care tasks, recreational activities, household chores, household mobility, and community mobility as compared to previous level of function.   Pt presents with LBP and glut pain following his fall. He will benefit a course of PT including manual therapy, theraputic exercise.     Clinical Decision Making (Complexity):  Clinical Presentation: Stable/Uncomplicated  Clinical Presentation Rationale: based on medical and personal factors listed in PT evaluation  Clinical Decision Making (Complexity): Low complexity    PLAN OF CARE  Treatment Interventions:  Interventions: Manual Therapy, Neuromuscular Re-education, Therapeutic Exercise, Self-Care/Home Management    Long Term Goals            Frequency of Treatment: weekly  Duration of Treatment: 12 weeks    Recommended Referrals to Other Professionals: Physical Therapy  Education Assessment:        Risks and benefits of evaluation/treatment have been explained.   Patient/Family/caregiver agrees with Plan of Care.     Evaluation Time:     PT Eval, Low Complexity Minutes (01266): 15       Signing Clinician: Kevin Leon, PT          [facility]                                                                                   OUTPATIENT PHYSICAL THERAPY    PLAN OF TREATMENT FOR OUTPATIENT REHABILITATION   Patient's Last Name, First Name, Cezar Marks YOB: 1950    Provider's Name   [facility]   Medical Record No.  [id number]     Onset Date: 01/06/25  Start of Care Date: 10/14/24     Medical Diagnosis:  low back pain due to trauma      PT Treatment Diagnosis:  LBP due to trauma Plan of Treatment  Frequency/Duration: weekly/ 12 weeks    Certification date from 10/14/24 to 01/06/25         See note for plan of treatment details and functional goals     Kevin Leon PT                         I CERTIFY THE NEED FOR THESE SERVICES FURNISHED UNDER        THIS PLAN OF TREATMENT AND WHILE UNDER MY CARE     (Physician attestation of this document indicates review and certification of the therapy plan).              Referring Provider:  Beba Bartholomew    Initial Assessment  See Epic Evaluation- Start of Care Date: 10/14/24          [facility]                                                                                   OUTPATIENT PHYSICAL THERAPY      PLAN OF TREATMENT FOR OUTPATIENT REHABILITATION   Patient's Last Name, First Name, Cezar Marks YOB: 1950   Provider's Name   [facility]   Medical Record No.  [id number]     Onset Date: 01/06/25  Start of Care Date: 10/14/24     Medical Diagnosis:  low back pain due to trauma      PT Treatment Diagnosis:  LBP due to trauma Plan of Treatment  Frequency/Duration: weekly/ 12 weeks    Certification date from 10/14/24 to 01/06/25         See note for plan of treatment details and functional goals     Kevin Leon, PT                         I CERTIFY THE NEED FOR THESE SERVICES FURNISHED UNDER        THIS PLAN OF TREATMENT AND WHILE UNDER MY CARE     (Physician attestation of this document indicates review and certification of the therapy plan).              Referring Provider:  Beba Bartholomew    Initial Assessment  See Epic Evaluation- Start of Care Date: 10/14/24

## 2024-10-28 ENCOUNTER — THERAPY VISIT (OUTPATIENT)
Dept: PHYSICAL THERAPY | Facility: CLINIC | Age: 74
End: 2024-10-28
Payer: COMMERCIAL

## 2024-10-28 DIAGNOSIS — G89.11 ACUTE LOW BACK PAIN DUE TO TRAUMA: Primary | ICD-10-CM

## 2024-10-28 DIAGNOSIS — M54.50 ACUTE LOW BACK PAIN DUE TO TRAUMA: Primary | ICD-10-CM

## 2024-10-28 PROCEDURE — 97110 THERAPEUTIC EXERCISES: CPT | Mod: GP | Performed by: PHYSICAL THERAPIST

## 2024-10-28 PROCEDURE — 97140 MANUAL THERAPY 1/> REGIONS: CPT | Mod: GP | Performed by: PHYSICAL THERAPIST

## 2024-11-14 ENCOUNTER — MYC REFILL (OUTPATIENT)
Dept: FAMILY MEDICINE | Facility: CLINIC | Age: 74
End: 2024-11-14

## 2024-11-14 DIAGNOSIS — I10 HYPERTENSION GOAL BP (BLOOD PRESSURE) < 140/90: ICD-10-CM

## 2024-11-15 RX ORDER — AMLODIPINE BESYLATE 2.5 MG/1
2.5 TABLET ORAL DAILY
Qty: 30 TABLET | Refills: 0 | Status: SHIPPED | OUTPATIENT
Start: 2024-11-15

## 2024-11-21 ENCOUNTER — OFFICE VISIT (OUTPATIENT)
Dept: FAMILY MEDICINE | Facility: CLINIC | Age: 74
End: 2024-11-21
Payer: COMMERCIAL

## 2024-11-21 VITALS
SYSTOLIC BLOOD PRESSURE: 165 MMHG | OXYGEN SATURATION: 100 % | DIASTOLIC BLOOD PRESSURE: 94 MMHG | HEIGHT: 69 IN | RESPIRATION RATE: 16 BRPM | TEMPERATURE: 97.9 F | HEART RATE: 66 BPM | BODY MASS INDEX: 24.44 KG/M2 | WEIGHT: 165 LBS

## 2024-11-21 DIAGNOSIS — I10 HYPERTENSION GOAL BP (BLOOD PRESSURE) < 140/90: Primary | ICD-10-CM

## 2024-11-21 DIAGNOSIS — D50.9 IRON DEFICIENCY ANEMIA, UNSPECIFIED IRON DEFICIENCY ANEMIA TYPE: ICD-10-CM

## 2024-11-21 DIAGNOSIS — E78.5 HYPERLIPIDEMIA, UNSPECIFIED HYPERLIPIDEMIA TYPE: ICD-10-CM

## 2024-11-21 LAB
ERYTHROCYTE [DISTWIDTH] IN BLOOD BY AUTOMATED COUNT: 13 % (ref 10–15)
HCT VFR BLD AUTO: 42.4 % (ref 40–53)
HGB BLD-MCNC: 13.7 G/DL (ref 13.3–17.7)
MCH RBC QN AUTO: 29.4 PG (ref 26.5–33)
MCHC RBC AUTO-ENTMCNC: 32.3 G/DL (ref 31.5–36.5)
MCV RBC AUTO: 91 FL (ref 78–100)
PLATELET # BLD AUTO: 251 10E3/UL (ref 150–450)
RBC # BLD AUTO: 4.66 10E6/UL (ref 4.4–5.9)
WBC # BLD AUTO: 6 10E3/UL (ref 4–11)

## 2024-11-21 RX ORDER — AMLODIPINE BESYLATE 2.5 MG/1
5 TABLET ORAL DAILY
Qty: 180 TABLET | Refills: 3 | Status: SHIPPED | OUTPATIENT
Start: 2024-11-21

## 2024-11-21 NOTE — PROGRESS NOTES
Assessment & Plan     1. Hypertension goal BP (blood pressure) < 140/90 (Primary)  - amLODIPine (NORVASC) 2.5 MG tablet; Take 2 tablets (5 mg) by mouth daily.  Dispense: 180 tablet; Refill: 3    Home readings improved, though still slightly higher than goal of less than 130/80.  Agreeable to trial 5mg daily, though patient prefers to take two 2.5mg tablets instead of a 5mg tablet in the event we reduce dose back to 2.5mg daily.     2. Iron deficiency anemia, unspecified iron deficiency anemia type  - Ferritin; Future  - CBC with platelets; Future    Off iron supplementation, trend labs today. Denies any recurrent bleeding concerns.     3. Hyperlipidemia, unspecified hyperlipidemia type    Reviewed labs, ASCVD risk. Patient hesitant to take statin, prefers to continue lifestyle modifications. Provided education regarding mediterranean diet, recheck labs next physical.    The longitudinal plan of care for the diagnosis(es)/condition(s) as documented were addressed during this visit. Due to the added complexity in care, I will continue to support Cezar in the subsequent management and with ongoing continuity of care.     Peter Robb is a 74 year old, presenting for the following health issues:  Recheck Medication (Check iron levels/Blood Pressure meds)      11/21/2024     2:12 PM   Additional Questions   Roomed by Alvarez ALBARADO MA     History of Present Illness       Hypertension: He presents for follow up of hypertension.  He does check blood pressure  regularly outside of the clinic. Outpatient blood pressures have not been over 140/90. He follows a low salt diet.     He eats 2-3 servings of fruits and vegetables daily.He consumes 1 sweetened beverage(s) daily.He exercises with enough effort to increase his heart rate 20 to 29 minutes per day.  He exercises with enough effort to increase his heart rate 4 days per week.   He is taking medications regularly.     Hypertension goal BP (blood pressure) < 140/90  "(Primary)  BP Readings from Last 6 Encounters:   11/21/24 (!) 143/98   10/08/24 (!) 146/98   08/07/24 (!) 169/98   07/30/24 (!) 148/87   06/18/24 (!) 162/93   06/05/24 (!) 141/84     Currently taking amlodipine 2.5mg daily.   Checks readings at home, 130/80ish.  Thinks tolerating amlodipine well.       Iron deficiency anemia, unspecified iron deficiency anemia type  Previous rectal bleeding, resolved. We treated with iron replacement which resolved iron deficiency anemia, was due for surveillance labs off ferritin in August. Denies any further rectal bleeding.      Hyperlipidemia, unspecified hyperlipidemia type  The 10-year ASCVD risk score (Shady DONAHUE, et al., 2019) is: 31.6%    Values used to calculate the score:      Age: 74 years      Sex: Male      Is Non- : No      Diabetic: No      Tobacco smoker: No      Systolic Blood Pressure: 143 mmHg      Is BP treated: Yes      HDL Cholesterol: 54 mg/dL      Total Cholesterol: 215 mg/dL     Lab Results   Component Value Date    CHOL 215 06/05/2024    CHOL 226 02/16/2021     Lab Results   Component Value Date    HDL 54 06/05/2024    HDL 41 02/16/2021     Lab Results   Component Value Date     06/05/2024     02/16/2021     Lab Results   Component Value Date    TRIG 105 06/05/2024    TRIG 319 02/16/2021     Pravastatin on medication list though reportedly not taking.  Declines statin medication at this time.       Review of Systems  See HPI above.         Objective      BP (!) 143/98   Pulse 66   Temp 97.9  F (36.6  C) (Oral)   Resp 16   Ht 1.74 m (5' 8.5\")   Wt 74.8 kg (165 lb)   SpO2 100%   BMI 24.72 kg/m    Body mass index is 24.72 kg/m .  Physical Exam   GENERAL: alert and no distress  NECK: no adenopathy, no asymmetry, masses, or scars  RESP: lungs clear to auscultation - no rales, rhonchi or wheezes  CV: regular rate and rhythm, normal S1 S2, no S3 or S4, no murmur, click or rub, no peripheral edema  ABDOMEN: soft, " nontender, no hepatosplenomegaly, no masses and bowel sounds normal  MS: no gross musculoskeletal defects noted, no edema            Signed Electronically by: Alea Estes, DO

## 2024-11-26 ENCOUNTER — MYC REFILL (OUTPATIENT)
Dept: FAMILY MEDICINE | Facility: CLINIC | Age: 74
End: 2024-11-26

## 2024-11-26 DIAGNOSIS — D50.9 IRON DEFICIENCY ANEMIA, UNSPECIFIED IRON DEFICIENCY ANEMIA TYPE: ICD-10-CM

## 2024-11-27 RX ORDER — FERROUS SULFATE 325(65) MG
325 TABLET ORAL
Qty: 90 TABLET | Refills: 0 | Status: SHIPPED | OUTPATIENT
Start: 2024-11-27

## 2024-11-27 NOTE — TELEPHONE ENCOUNTER
Iron deficiency anemia, unspecified iron deficiency anemia type  - ferrous sulfate (FEROSUL) 325 (65 Fe) MG tablet; Take 1 tablet (325 mg) by mouth daily (with breakfast).  Dispense: 90 tablet; Refill: 0  - Ferritin; Future     Resume iron supplementation, recheck labs in 3 months.    Alea Estes, DO

## 2024-12-12 PROBLEM — M54.50 ACUTE LOW BACK PAIN DUE TO TRAUMA: Status: RESOLVED | Noted: 2024-10-14 | Resolved: 2024-12-12

## 2024-12-12 PROBLEM — G89.11 ACUTE LOW BACK PAIN DUE TO TRAUMA: Status: RESOLVED | Noted: 2024-10-14 | Resolved: 2024-12-12

## 2025-01-13 ENCOUNTER — MYC MEDICAL ADVICE (OUTPATIENT)
Dept: FAMILY MEDICINE | Facility: CLINIC | Age: 75
End: 2025-01-13
Payer: COMMERCIAL

## 2025-05-06 ENCOUNTER — PATIENT OUTREACH (OUTPATIENT)
Dept: CARE COORDINATION | Facility: CLINIC | Age: 75
End: 2025-05-06
Payer: COMMERCIAL

## 2025-05-11 ENCOUNTER — HEALTH MAINTENANCE LETTER (OUTPATIENT)
Age: 75
End: 2025-05-11

## 2025-05-20 ENCOUNTER — PATIENT OUTREACH (OUTPATIENT)
Dept: CARE COORDINATION | Facility: CLINIC | Age: 75
End: 2025-05-20
Payer: COMMERCIAL

## 2025-07-18 ENCOUNTER — OFFICE VISIT (OUTPATIENT)
Dept: OPHTHALMOLOGY | Facility: CLINIC | Age: 75
End: 2025-07-18
Payer: COMMERCIAL

## 2025-07-18 DIAGNOSIS — H52.13 MYOPIA OF BOTH EYES WITH ASTIGMATISM AND PRESBYOPIA: ICD-10-CM

## 2025-07-18 DIAGNOSIS — H25.13 NUCLEAR SCLEROTIC CATARACT OF BOTH EYES: ICD-10-CM

## 2025-07-18 DIAGNOSIS — H02.88A MEIBOMIAN GLAND DYSFUNCTION (MGD) OF UPPER AND LOWER LIDS OF BOTH EYES: ICD-10-CM

## 2025-07-18 DIAGNOSIS — Z01.00 EXAMINATION OF EYES AND VISION: Primary | ICD-10-CM

## 2025-07-18 DIAGNOSIS — H02.88B MEIBOMIAN GLAND DYSFUNCTION (MGD) OF UPPER AND LOWER LIDS OF BOTH EYES: ICD-10-CM

## 2025-07-18 DIAGNOSIS — H52.203 MYOPIA OF BOTH EYES WITH ASTIGMATISM AND PRESBYOPIA: ICD-10-CM

## 2025-07-18 DIAGNOSIS — H52.4 MYOPIA OF BOTH EYES WITH ASTIGMATISM AND PRESBYOPIA: ICD-10-CM

## 2025-07-18 PROCEDURE — 92015 DETERMINE REFRACTIVE STATE: CPT | Performed by: STUDENT IN AN ORGANIZED HEALTH CARE EDUCATION/TRAINING PROGRAM

## 2025-07-18 PROCEDURE — 92014 COMPRE OPH EXAM EST PT 1/>: CPT | Performed by: STUDENT IN AN ORGANIZED HEALTH CARE EDUCATION/TRAINING PROGRAM

## 2025-07-18 ASSESSMENT — CUP TO DISC RATIO
OS_RATIO: 0.2
OD_RATIO: 0.2

## 2025-07-18 ASSESSMENT — TONOMETRY
OS_IOP_MMHG: 15
IOP_METHOD: APPLANATION
OD_IOP_MMHG: 15

## 2025-07-18 ASSESSMENT — REFRACTION_MANIFEST
OD_ADD: +2.75
OS_AXIS: 164
OD_CYLINDER: +1.25
OS_SPHERE: -1.50
OS_ADD: +2.75
OD_SPHERE: -2.50
OD_AXIS: 030
OS_CYLINDER: +1.25

## 2025-07-18 ASSESSMENT — CONF VISUAL FIELD
OD_SUPERIOR_TEMPORAL_RESTRICTION: 0
OD_SUPERIOR_NASAL_RESTRICTION: 0
OS_INFERIOR_NASAL_RESTRICTION: 0
OD_INFERIOR_NASAL_RESTRICTION: 0
OS_INFERIOR_TEMPORAL_RESTRICTION: 0
METHOD: COUNTING FINGERS
OD_NORMAL: 1
OS_SUPERIOR_TEMPORAL_RESTRICTION: 0
OS_SUPERIOR_NASAL_RESTRICTION: 0
OD_INFERIOR_TEMPORAL_RESTRICTION: 0
OS_NORMAL: 1

## 2025-07-18 ASSESSMENT — VISUAL ACUITY
OD_PH_SC: 20/40
OD_BAT_MED: 20/200
OS_PH_SC: 20/30
OS_BAT_MED: 20/40
OS_SC+: -2
OS_SC: 20/40
OS_BAT_HIGH: 20/100
OD_BAT_HIGH: >20/400
OD_SC+: -1
OS_PH_SC+: +2
OD_PH_SC+: -2
OD_SC: 20/70
METHOD: SNELLEN - LINEAR

## 2025-07-18 ASSESSMENT — EXTERNAL EXAM - LEFT EYE: OS_EXAM: NORMAL

## 2025-07-18 ASSESSMENT — SLIT LAMP EXAM - LIDS: COMMENTS: 1+ BLEPHARITIS, 1+ MEIBOMIAN GLAND DYSFUNCTION

## 2025-07-18 ASSESSMENT — EXTERNAL EXAM - RIGHT EYE: OD_EXAM: NORMAL

## 2025-07-18 NOTE — LETTER
7/18/2025      Cezar Ward  [address] [id number]      Dear Colleague,    Thank you for referring your patient, Cezar Ward, to the [facility]. Please see a copy of my visit note below.     Current Eye Medications:  none     Subjective:  comprehensive eye exam - gradual worsening in right eye vision, noticing more trouble with night time driving over the last year. Left eye vision is stable. Right eye also tears.      Objective:  See Ophthalmology Exam.    Assessment:  Cezar Ward is a 74 year old male who presents with:   Encounter Diagnoses   Name Primary?     Examination of eyes and vision      Nuclear sclerotic cataract of both eyes Has not worn glasses since I first saw him in 2021, even though I recommended them. Discussed that if he truly desires to be out of glasses, that a premium lens package might be a good fit for him. Discussed that if he pursues surgery with me, that he will need glasses afterwards.     Dil 7. Consider monovision with right eye near and left eye distance.      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 Moise.     Can discuss premium lens options for cataract surgery with [facility] or [facility].      If you have questions or would like to schedule cataract surgery with Dr. Moise, call Anitra at [phone].     If you elect not to have any surgery at this time, Return in one year for a complete eye exam.     Michele Moise MD  [phone]         Again, thank you for allowing me to participate in the care of your patient.        Sincerely,        Michele Moise MD    Electronically signed

## 2025-07-18 NOTE — PROGRESS NOTES
Current Eye Medications:  none     Subjective:  comprehensive eye exam - gradual worsening in right eye vision, noticing more trouble with night time driving over the last year. Left eye vision is stable. Right eye also tears.      Objective:  See Ophthalmology Exam.    Assessment:  Cezar Ward is a 74 year old male who presents with:   Encounter Diagnoses   Name Primary?    Examination of eyes and vision     Nuclear sclerotic cataract of both eyes Has not worn glasses since I first saw him in 2021, even though I recommended them. Discussed that if he truly desires to be out of glasses, that a premium lens package might be a good fit for him. Discussed that if he pursues surgery with me, that he will need glasses afterwards.     Dil 7. Consider monovision with right eye near and left eye distance.     Meibomian gland dysfunction (MGD) of upper and lower lids of both eyes     Myopia of both eyes with astigmatism and presbyopia        Plan:  Offered cataract surgery of the right eye then left eye at [facility] with Dr. Moise.     Can discuss premium lens options for cataract surgery with [facility] or [facility].      If you have questions or would like to schedule cataract surgery with Dr. Moise, call Anitra at [phone].     If you elect not to have any surgery at this time, Return in one year for a complete eye exam.     Michele Moise MD  [phone]

## 2025-07-18 NOTE — PATIENT INSTRUCTIONS
Offered cataract surgery of the right eye then left eye at [facility] with Dr. Moise.     Can discuss premium lens options for cataract surgery with [facility] or [facility].      If you have questions or would like to schedule cataract surgery with Dr. Moise, call Anitra at [phone].     If you elect not to have any surgery at this time, Return in one year for a complete eye exam.     Michele Moise MD  [phone]

## 2025-08-10 SDOH — HEALTH STABILITY: PHYSICAL HEALTH: ON AVERAGE, HOW MANY DAYS PER WEEK DO YOU ENGAGE IN MODERATE TO STRENUOUS EXERCISE (LIKE A BRISK WALK)?: 4 DAYS

## 2025-08-10 SDOH — HEALTH STABILITY: PHYSICAL HEALTH: ON AVERAGE, HOW MANY MINUTES DO YOU ENGAGE IN EXERCISE AT THIS LEVEL?: 60 MIN

## 2025-08-10 ASSESSMENT — SOCIAL DETERMINANTS OF HEALTH (SDOH): HOW OFTEN DO YOU GET TOGETHER WITH FRIENDS OR RELATIVES?: ONCE A WEEK

## 2025-08-12 ENCOUNTER — OFFICE VISIT (OUTPATIENT)
Dept: FAMILY MEDICINE | Facility: CLINIC | Age: 75
End: 2025-08-12
Payer: COMMERCIAL

## 2025-08-12 VITALS
HEIGHT: 67 IN | DIASTOLIC BLOOD PRESSURE: 89 MMHG | OXYGEN SATURATION: 99 % | TEMPERATURE: 98.5 F | HEART RATE: 62 BPM | BODY MASS INDEX: 25.27 KG/M2 | RESPIRATION RATE: 16 BRPM | WEIGHT: 161 LBS | SYSTOLIC BLOOD PRESSURE: 140 MMHG

## 2025-08-12 DIAGNOSIS — Z86.39 HX OF IRON DEFICIENCY: ICD-10-CM

## 2025-08-12 DIAGNOSIS — E78.5 HYPERLIPIDEMIA, UNSPECIFIED HYPERLIPIDEMIA TYPE: ICD-10-CM

## 2025-08-12 DIAGNOSIS — E55.9 VITAMIN D DEFICIENCY, UNSPECIFIED: ICD-10-CM

## 2025-08-12 DIAGNOSIS — D50.9 IRON DEFICIENCY ANEMIA, UNSPECIFIED IRON DEFICIENCY ANEMIA TYPE: ICD-10-CM

## 2025-08-12 DIAGNOSIS — Z12.5 SCREENING FOR PROSTATE CANCER: ICD-10-CM

## 2025-08-12 DIAGNOSIS — I10 HYPERTENSION GOAL BP (BLOOD PRESSURE) < 140/90: ICD-10-CM

## 2025-08-12 DIAGNOSIS — Z23 NEED FOR VACCINATION: ICD-10-CM

## 2025-08-12 DIAGNOSIS — Z00.00 ENCOUNTER FOR MEDICARE ANNUAL WELLNESS EXAM: Primary | ICD-10-CM

## 2025-08-12 LAB
ALBUMIN SERPL BCG-MCNC: 4.2 G/DL (ref 3.5–5.2)
ALP SERPL-CCNC: 96 U/L (ref 40–150)
ALT SERPL W P-5'-P-CCNC: 15 U/L (ref 0–70)
ANION GAP SERPL CALCULATED.3IONS-SCNC: 9 MMOL/L (ref 7–15)
AST SERPL W P-5'-P-CCNC: 27 U/L (ref 0–45)
BILIRUB SERPL-MCNC: 0.4 MG/DL
BUN SERPL-MCNC: 17 MG/DL (ref 8–23)
CALCIUM SERPL-MCNC: 9.5 MG/DL (ref 8.8–10.4)
CHLORIDE SERPL-SCNC: 103 MMOL/L (ref 98–107)
CHOLEST SERPL-MCNC: 198 MG/DL
CREAT SERPL-MCNC: 0.99 MG/DL (ref 0.67–1.17)
EGFRCR SERPLBLD CKD-EPI 2021: 80 ML/MIN/1.73M2
ERYTHROCYTE [DISTWIDTH] IN BLOOD BY AUTOMATED COUNT: 13.4 % (ref 10–15)
FASTING STATUS PATIENT QL REPORTED: YES
FASTING STATUS PATIENT QL REPORTED: YES
FERRITIN SERPL-MCNC: 109 NG/ML (ref 31–409)
GLUCOSE SERPL-MCNC: 88 MG/DL (ref 70–99)
HCO3 SERPL-SCNC: 28 MMOL/L (ref 22–29)
HCT VFR BLD AUTO: 41 % (ref 40–53)
HDLC SERPL-MCNC: 66 MG/DL
HGB BLD-MCNC: 13.3 G/DL (ref 13.3–17.7)
IRON BINDING CAPACITY (ROCHE): 303 UG/DL (ref 240–430)
IRON SATN MFR SERPL: 28 % (ref 15–46)
IRON SERPL-MCNC: 84 UG/DL (ref 61–157)
LDLC SERPL CALC-MCNC: 120 MG/DL
MCH RBC QN AUTO: 29.6 PG (ref 26.5–33)
MCHC RBC AUTO-ENTMCNC: 32.4 G/DL (ref 31.5–36.5)
MCV RBC AUTO: 91.3 FL (ref 78–100)
NONHDLC SERPL-MCNC: 132 MG/DL
PLATELET # BLD AUTO: 241 10E3/UL (ref 150–450)
POTASSIUM SERPL-SCNC: 4.1 MMOL/L (ref 3.4–5.3)
PROT SERPL-MCNC: 7.1 G/DL (ref 6.4–8.3)
PSA SERPL DL<=0.01 NG/ML-MCNC: 2.43 NG/ML (ref 0–6.5)
RBC # BLD AUTO: 4.49 10E6/UL (ref 4.4–5.9)
SODIUM SERPL-SCNC: 140 MMOL/L (ref 135–145)
TRIGL SERPL-MCNC: 59 MG/DL
VIT D+METAB SERPL-MCNC: 42 NG/ML (ref 20–50)
WBC # BLD AUTO: 5.12 10E3/UL (ref 4–11)

## 2025-08-12 PROCEDURE — 83550 IRON BINDING TEST: CPT | Performed by: FAMILY MEDICINE

## 2025-08-12 PROCEDURE — 82728 ASSAY OF FERRITIN: CPT | Performed by: FAMILY MEDICINE

## 2025-08-12 PROCEDURE — G0103 PSA SCREENING: HCPCS | Performed by: FAMILY MEDICINE

## 2025-08-12 PROCEDURE — 80061 LIPID PANEL: CPT | Performed by: FAMILY MEDICINE

## 2025-08-12 PROCEDURE — 83540 ASSAY OF IRON: CPT | Performed by: FAMILY MEDICINE

## 2025-08-12 PROCEDURE — 82306 VITAMIN D 25 HYDROXY: CPT | Performed by: FAMILY MEDICINE

## 2025-08-12 PROCEDURE — 80053 COMPREHEN METABOLIC PANEL: CPT | Performed by: FAMILY MEDICINE

## 2025-08-12 PROCEDURE — 36415 COLL VENOUS BLD VENIPUNCTURE: CPT | Performed by: FAMILY MEDICINE

## 2025-08-12 PROCEDURE — 85027 COMPLETE CBC AUTOMATED: CPT | Performed by: FAMILY MEDICINE

## 2025-08-12 RX ORDER — AMLODIPINE BESYLATE 2.5 MG/1
5 TABLET ORAL DAILY
Qty: 180 TABLET | Refills: 3 | Status: SHIPPED | OUTPATIENT
Start: 2025-08-12